# Patient Record
Sex: FEMALE | Race: WHITE | NOT HISPANIC OR LATINO | Employment: FULL TIME | URBAN - METROPOLITAN AREA
[De-identification: names, ages, dates, MRNs, and addresses within clinical notes are randomized per-mention and may not be internally consistent; named-entity substitution may affect disease eponyms.]

---

## 2017-07-07 ENCOUNTER — HOSPITAL ENCOUNTER (EMERGENCY)
Facility: CLINIC | Age: 42
Discharge: HOME | End: 2017-07-07
Attending: EMERGENCY MEDICINE | Admitting: EMERGENCY MEDICINE
Payer: COMMERCIAL

## 2017-07-07 VITALS
RESPIRATION RATE: 18 BRPM | OXYGEN SATURATION: 97 % | HEART RATE: 111 BPM | TEMPERATURE: 98.3 F | DIASTOLIC BLOOD PRESSURE: 67 MMHG | SYSTOLIC BLOOD PRESSURE: 97 MMHG

## 2017-07-07 DIAGNOSIS — R19.7 DIARRHEA, UNSPECIFIED TYPE: ICD-10-CM

## 2017-07-07 LAB
ALBUMIN SERPL-MCNC: 3.8 G/DL (ref 3.4–5)
ALP SERPL-CCNC: 59 U/L (ref 40–150)
ALT SERPL W P-5'-P-CCNC: 18 U/L (ref 0–50)
ANION GAP SERPL CALCULATED.3IONS-SCNC: 7 MMOL/L (ref 3–14)
AST SERPL W P-5'-P-CCNC: 12 U/L (ref 0–45)
BASOPHILS # BLD AUTO: 0 10E9/L (ref 0–0.2)
BASOPHILS NFR BLD AUTO: 0.1 %
BILIRUB SERPL-MCNC: 1.2 MG/DL (ref 0.2–1.3)
BUN SERPL-MCNC: 9 MG/DL (ref 7–30)
C DIFF TOX B STL QL: NORMAL
CALCIUM SERPL-MCNC: 8.3 MG/DL (ref 8.5–10.1)
CAMPYLOBACTER GROUP BY NAT: NOT DETECTED
CHLORIDE SERPL-SCNC: 110 MMOL/L (ref 94–109)
CO2 SERPL-SCNC: 23 MMOL/L (ref 20–32)
CREAT SERPL-MCNC: 0.67 MG/DL (ref 0.52–1.04)
DIFFERENTIAL METHOD BLD: NORMAL
ENTERIC PATHOGEN COMMENT: NORMAL
EOSINOPHIL # BLD AUTO: 0.2 10E9/L (ref 0–0.7)
EOSINOPHIL NFR BLD AUTO: 2.8 %
ERYTHROCYTE [DISTWIDTH] IN BLOOD BY AUTOMATED COUNT: 13.1 % (ref 10–15)
GFR SERPL CREATININE-BSD FRML MDRD: ABNORMAL ML/MIN/1.7M2
GLUCOSE SERPL-MCNC: 90 MG/DL (ref 70–99)
HCT VFR BLD AUTO: 42.1 % (ref 35–47)
HEMOCCULT STL QL: NEGATIVE
HGB BLD-MCNC: 14.1 G/DL (ref 11.7–15.7)
IMM GRANULOCYTES # BLD: 0 10E9/L (ref 0–0.4)
IMM GRANULOCYTES NFR BLD: 0.4 %
LACTOFERRIN STL QL IA: ABNORMAL
LYMPHOCYTES # BLD AUTO: 0.8 10E9/L (ref 0.8–5.3)
LYMPHOCYTES NFR BLD AUTO: 11.7 %
MCH RBC QN AUTO: 31 PG (ref 26.5–33)
MCHC RBC AUTO-ENTMCNC: 33.5 G/DL (ref 31.5–36.5)
MCV RBC AUTO: 93 FL (ref 78–100)
MONOCYTES # BLD AUTO: 0.3 10E9/L (ref 0–1.3)
MONOCYTES NFR BLD AUTO: 4.4 %
NEUTROPHILS # BLD AUTO: 5.5 10E9/L (ref 1.6–8.3)
NEUTROPHILS NFR BLD AUTO: 80.6 %
NOROVIRUS I AND II BY NAT: NOT DETECTED
NRBC # BLD AUTO: 0 10*3/UL
NRBC BLD AUTO-RTO: 0 /100
PLATELET # BLD AUTO: 179 10E9/L (ref 150–450)
POTASSIUM SERPL-SCNC: 3.6 MMOL/L (ref 3.4–5.3)
PROT SERPL-MCNC: 6.8 G/DL (ref 6.8–8.8)
RBC # BLD AUTO: 4.55 10E12/L (ref 3.8–5.2)
ROTAVIRUS A BY NAT: NOT DETECTED
SALMONELLA SPECIES BY NAT: NOT DETECTED
SHIGA TOXIN 1 GENE BY NAT: NOT DETECTED
SHIGA TOXIN 2 GENE BY NAT: NOT DETECTED
SHIGELLA SP+EIEC IPAH STL QL NAA+PROBE: NOT DETECTED
SODIUM SERPL-SCNC: 140 MMOL/L (ref 133–144)
SPECIMEN SOURCE: NORMAL
VIBRIO GROUP BY NAT: NOT DETECTED
WBC # BLD AUTO: 6.8 10E9/L (ref 4–11)
YERSINIA ENTEROCOLITICA BY NAT: NOT DETECTED

## 2017-07-07 PROCEDURE — 87493 C DIFF AMPLIFIED PROBE: CPT | Performed by: EMERGENCY MEDICINE

## 2017-07-07 PROCEDURE — 83630 LACTOFERRIN FECAL (QUAL): CPT | Performed by: EMERGENCY MEDICINE

## 2017-07-07 PROCEDURE — 80053 COMPREHEN METABOLIC PANEL: CPT | Performed by: EMERGENCY MEDICINE

## 2017-07-07 PROCEDURE — 96360 HYDRATION IV INFUSION INIT: CPT

## 2017-07-07 PROCEDURE — 87506 IADNA-DNA/RNA PROBE TQ 6-11: CPT | Performed by: EMERGENCY MEDICINE

## 2017-07-07 PROCEDURE — 36415 COLL VENOUS BLD VENIPUNCTURE: CPT | Performed by: EMERGENCY MEDICINE

## 2017-07-07 PROCEDURE — 85025 COMPLETE CBC W/AUTO DIFF WBC: CPT | Performed by: EMERGENCY MEDICINE

## 2017-07-07 PROCEDURE — 82272 OCCULT BLD FECES 1-3 TESTS: CPT | Performed by: EMERGENCY MEDICINE

## 2017-07-07 PROCEDURE — 99283 EMERGENCY DEPT VISIT LOW MDM: CPT | Mod: 25

## 2017-07-07 PROCEDURE — 96361 HYDRATE IV INFUSION ADD-ON: CPT

## 2017-07-07 PROCEDURE — 25000128 H RX IP 250 OP 636: Performed by: EMERGENCY MEDICINE

## 2017-07-07 PROCEDURE — 25000132 ZZH RX MED GY IP 250 OP 250 PS 637: Performed by: EMERGENCY MEDICINE

## 2017-07-07 RX ORDER — LIDOCAINE 40 MG/G
CREAM TOPICAL
Status: DISCONTINUED | OUTPATIENT
Start: 2017-07-07 | End: 2017-07-07 | Stop reason: HOSPADM

## 2017-07-07 RX ORDER — ACETAMINOPHEN 500 MG
1000 TABLET ORAL ONCE
Status: COMPLETED | OUTPATIENT
Start: 2017-07-07 | End: 2017-07-07

## 2017-07-07 RX ORDER — SODIUM CHLORIDE 9 MG/ML
1000 INJECTION, SOLUTION INTRAVENOUS CONTINUOUS
Status: DISCONTINUED | OUTPATIENT
Start: 2017-07-07 | End: 2017-07-07 | Stop reason: HOSPADM

## 2017-07-07 RX ORDER — DOCUSATE CALCIUM 240 MG
240 CAPSULE ORAL DAILY
Status: DISCONTINUED | OUTPATIENT
Start: 2017-07-07 | End: 2017-07-07 | Stop reason: CLARIF

## 2017-07-07 RX ORDER — DOCUSATE SODIUM 100 MG/1
200 CAPSULE, LIQUID FILLED ORAL DAILY
Status: DISCONTINUED | OUTPATIENT
Start: 2017-07-07 | End: 2017-07-07

## 2017-07-07 RX ADMIN — SODIUM CHLORIDE 1000 ML: 9 INJECTION, SOLUTION INTRAVENOUS at 06:43

## 2017-07-07 RX ADMIN — ACETAMINOPHEN 1000 MG: 500 TABLET, FILM COATED ORAL at 07:28

## 2017-07-07 RX ADMIN — SODIUM CHLORIDE 1000 ML: 9 INJECTION, SOLUTION INTRAVENOUS at 09:06

## 2017-07-07 ASSESSMENT — ENCOUNTER SYMPTOMS
DIARRHEA: 1
ABDOMINAL PAIN: 1
ABDOMINAL DISTENTION: 1
FEVER: 0
CHILLS: 0

## 2017-07-07 NOTE — ED AVS SNAPSHOT
[facility] Emergency Department    [address] [place] [address]    Phone:  [phone]    Fax:  [phone]                                       Alexia Mcginnis   MRN: [id number]    Department:  [facility] Emergency Department   Date of Visit:  7/7/2017           Patient Information     Date Of Birth          1975        Your diagnoses for this visit were:     Diarrhea, unspecified type        You were seen by Jovi Cortés MD and Narciso Ferraro MD.      Follow-up Information     Follow up with Jamaica Kiser MD In 1 day.    Specialty:  Family Practice    Contact information:    [facility]  [address]  [facility] [address]  [phone]          Discharge Instructions       Discharge Instructions  Adult Diarrhea    You have been seen today for diarrhea. This is usually caused by a virus, but some bacteria, parasites, medicines or other medical conditions can cause similar symptoms. At this time your doctor does not find that your diarrhea is a sign of anything dangerous or life-threatening. However, sometimes the signs of serious illness do not show up right away. If you have new or worse symptoms, you may need to be seen again in the Emergency Department or by your primary doctor.     Return to the Emergency Department if:    You feel you are getting dehydrated, such as being very thirsty, not urinating at least every 8-12 hours, or feeling faint or lightheaded.     You develop a new fever, or your fever continues for more than 2 days.     You have belly pain that seems worse than cramps, is in one spot, or is getting worse over time.     You have blood in your stool or your stool becomes black.  (Remember that if you take Pepto-Bismol , this will turn your stool black).     You feel very weak.    You are not starting to improve within 24 hours of your visit here.    What can I do to help myself?    The most important thing  "to do is to drink clear liquids.   It is best to have only small, frequent sips of liquids. Drinking too much at once may cause more diarrhea. You should also replace minerals, sodium and potassium lost with diarrhea. Pedialyte  and sports drinks can help you replace these minerals. You can also drink clear liquids such as water, weak tea, apple juice, and 7-Up . Avoid acid liquids (orange), caffeine (coffee) or alcohol. Milk products will make the diarrhea worse.     Eat only bland foods. Soda crackers, toast, plain noodles, gelatin, applesauce and bananas are good first choices. Avoid foods that have acid, are spicy, fatty or fibrous (such as meats, coarse grains, vegetables). You may start eating these foods again in about 3 days when you are better.     Sometimes treatment includes prescription medicine to prevent diarrhea. If your doctor prescribes these for you, take them as directed.     Nonprescription medicine is available for the treatment of diarrhea and can be very effective. If you use it, make sure you use the dose recommended on the package. Check with your healthcare provider before you use any medicine for diarrhea.     Don t take ibuprofen, or other nonsteroidal anti-inflammatory medicines without checking with your healthcare provider.   Probiotics: If you have been given an antibiotic, you may want to also take a probiotic pill or eat yogurt with live cultures. Probiotics have \"good bacteria\" to help your intestines stay healthy. Studies have shown that probiotics help prevent diarrhea and other intestine problems (including C. diff infection) when you take antibiotics. You can buy these without a prescription in the pharmacy section of the store.   If you were given a prescription for medicine here today, be sure to read all of the information (including the package insert) that comes with your prescription.  This will include important information about the medicine, its side effects, and any " warnings that you need to know about.  The pharmacist who fills the prescription can provide more information and answer questions you may have about the medicine.  If you have questions or concerns that the pharmacist cannot address, please call or return to the Emergency Department.   Opioid Medication Information    Pain medications are among the most commonly prescribed medicines, so we are including this information for all our patients. If you did not receive pain medication or get a prescription for pain medicine, you can ignore it.     You may have been given a prescription for an opioid (narcotic) pain medicine and/or have received a pain medicine while here in the Emergency Department. These medicines can make you drowsy or impaired. You must not drive, operate dangerous equipment, or engage in any other dangerous activities while taking these medications. If you drive while taking these medications, you could be arrested for DUI, or driving under the influence. Do not drink any alcohol while you are taking these medications.     Opioid pain medications can cause addiction. If you have a history of chemical dependency of any type, you are at a higher risk of becoming addicted to pain medications.  Only take these prescribed medications to treat your pain when all other options have been tried. Take it for as short a time and as few doses as possible. Store your pain pills in a secure place, as they are frequently stolen and provide a dangerous opportunity for children or visitors in your house to start abusing these powerful medications. We will not replace any lost or stolen medicine.  As soon as your pain is better, you should flush all your remaining medication.     Many prescription pain medications contain Tylenol  (acetaminophen), including Vicodin , Tylenol #3 , Norco , Lortab , and Percocet .  You should not take any extra pills of Tylenol  if you are using these prescription medications or you can  get very sick.  Do not ever take more than 3000 mg of acetaminophen in any 24 hour period.    All opioids tend to cause constipation. Drink plenty of water and eat foods that have a lot of fiber, such as fruits, vegetables, prune juice, apple juice and high fiber cereal.  Take a laxative if you don t move your bowels at least every other day. Miralax , Milk of Magnesia, Colace , or Senna  can be used to keep you regular.      Remember that you can always come back to the Emergency Department if you are not able to see your regular doctor in the amount of time listed above, if you get any new symptoms, or if there is anything that worries you.        24 Hour Appointment Hotline       To make an appointment at any [facility], call [phone] ([phone]). If you don't have a family doctor or clinic, we will help you find one. [facility] are conveniently located to serve the needs of you and your family.             Review of your medicines      Our records show that you are taking the medicines listed below. If these are incorrect, please call your family doctor or clinic.        Dose / Directions Last dose taken    cholestyramine 4 G Packet   Commonly known as:  QUESTRAN   Dose:  1 packet        Take 1 packet by mouth 3 times daily (with meals).   Refills:  0        EFFEXOR PO        Take by mouth 3 times daily   Refills:  0        estradiol cypionate 5 MG/ML injection   Commonly known as:  DEPO-ESTRADIOL        Inject  into the muscle every 28 days.   Refills:  0                Procedures and tests performed during your visit     CBC + differential    Clostridium difficile toxin B PCR    Comprehensive metabolic panel    Enteric Bacteria and Virus Panel by ISHMAEL Stool    Fecal Lactoferrin    Occult blood stool    Orthostatic blood pressure    Peripheral IV: Standard      Orders Needing Specimen Collection     None      Pending Results     Date and Time Order Name Status Description    7/7/2017 0547  Enteric Bacteria and Virus Panel by ISHMAEL Stool In process     7/7/2017 0547 Clostridium difficile toxin B PCR In process             Pending Culture Results     Date and Time Order Name Status Description    7/7/2017 0547 Enteric Bacteria and Virus Panel by ISHMAEL Stool In process     7/7/2017 0547 Clostridium difficile toxin B PCR In process             Pending Results Instructions     If you had any lab results that were not finalized at the time of your Discharge, you can call the ED Lab Result RN at [phone]. You will be contacted by this team for any positive Lab results or changes in treatment. The nurses are available 7 days a week from 10A to 6:30P.  You can leave a message 24 hours per day and they will return your call.        Test Results From Your Hospital Stay        7/7/2017  6:39 AM      Component Results     Component Value Ref Range & Units Status    WBC 6.8 4.0 - 11.0 10e9/L Final    RBC Count 4.55 3.8 - 5.2 10e12/L Final    Hemoglobin 14.1 11.7 - 15.7 g/dL Final    Hematocrit 42.1 35.0 - 47.0 % Final    MCV 93 78 - 100 fl Final    MCH 31.0 26.5 - 33.0 pg Final    MCHC 33.5 31.5 - 36.5 g/dL Final    RDW 13.1 10.0 - 15.0 % Final    Platelet Count 179 150 - 450 10e9/L Final    Diff Method Automated Method  Final    % Neutrophils 80.6 % Final    % Lymphocytes 11.7 % Final    % Monocytes 4.4 % Final    % Eosinophils 2.8 % Final    % Basophils 0.1 % Final    % Immature Granulocytes 0.4 % Final    Nucleated RBCs 0 0 /100 Final    Absolute Neutrophil 5.5 1.6 - 8.3 10e9/L Final    Absolute Lymphocytes 0.8 0.8 - 5.3 10e9/L Final    Absolute Monocytes 0.3 0.0 - 1.3 10e9/L Final    Absolute Eosinophils 0.2 0.0 - 0.7 10e9/L Final    Absolute Basophils 0.0 0.0 - 0.2 10e9/L Final    Abs Immature Granulocytes 0.0 0 - 0.4 10e9/L Final    Absolute Nucleated RBC 0.0  Final         7/7/2017  6:57 AM      Component Results     Component Value Ref Range & Units Status    Sodium 140 133 - 144 mmol/L Final    Potassium  3.6 3.4 - 5.3 mmol/L Final    Chloride 110 (H) 94 - 109 mmol/L Final    Carbon Dioxide 23 20 - 32 mmol/L Final    Anion Gap 7 3 - 14 mmol/L Final    Glucose 90 70 - 99 mg/dL Final    Urea Nitrogen 9 7 - 30 mg/dL Final    Creatinine 0.67 0.52 - 1.04 mg/dL Final    GFR Estimate >90  Non  GFR Calc   >60 mL/min/1.7m2 Final    GFR Estimate If Black >90   GFR Calc   >60 mL/min/1.7m2 Final    Calcium 8.3 (L) 8.5 - 10.1 mg/dL Final    Bilirubin Total 1.2 0.2 - 1.3 mg/dL Final    Albumin 3.8 3.4 - 5.0 g/dL Final    Protein Total 6.8 6.8 - 8.8 g/dL Final    Alkaline Phosphatase 59 40 - 150 U/L Final    ALT 18 0 - 50 U/L Final    AST 12 0 - 45 U/L Final         7/7/2017  6:03 AM         7/7/2017  6:03 AM         7/7/2017  6:37 AM      Component Results     Component Value Ref Range & Units Status    Occult Blood Negative NEG Final         7/7/2017  6:37 AM      Component Results     Component Value Ref Range & Units Status    Fecal Lactoferrin  NEG Final    Positive   Test not valid for breast fed patients.   (A)                Clinical Quality Measure: Blood Pressure Screening     Your blood pressure was checked while you were in the emergency department today. The last reading we obtained was  BP: 97/67 . Please read the guidelines below about what these numbers mean and what you should do about them.  If your systolic blood pressure (the top number) is less than 120 and your diastolic blood pressure (the bottom number) is less than 80, then your blood pressure is normal. There is nothing more that you need to do about it.  If your systolic blood pressure (the top number) is 120-139 or your diastolic blood pressure (the bottom number) is 80-89, your blood pressure may be higher than it should be. You should have your blood pressure rechecked within a year by a primary care provider.  If your systolic blood pressure (the top number) is 140 or greater or your diastolic blood pressure (the bottom  "number) is 90 or greater, you may have high blood pressure. High blood pressure is treatable, but if left untreated over time it can put you at risk for heart attack, stroke, or kidney failure. You should have your blood pressure rechecked by a primary care provider within the next 4 weeks.  If your provider in the emergency department today gave you specific instructions to follow-up with your doctor or provider even sooner than that, you should follow that instruction and not wait for up to 4 weeks for your follow-up visit.        Thank you for choosing [facility]       Thank you for choosing [facility] for your care. Our goal is always to provide you with excellent care. Hearing back from our patients is one way we can continue to improve our services. Please take a few minutes to complete the written survey that you may receive in the mail after you visit with us. Thank you!        [employer] Information     [employer] lets you send messages to your doctor, view your test results, renew your prescriptions, schedule appointments and more. To sign up, go to [email or web address] . Click on \"Log in\" on the left side of the screen, which will take you to the Welcome page. Then click on \"Sign up Now\" on the right side of the page.     You will be asked to enter the access code listed below, as well as some personal information. Please follow the directions to create your username and password.     Your access code is: [email or web address]  Expires: 10/5/2017  9:25 AM     Your access code will  in 90 days. If you need help or a new code, please call your [facility] clinic or [phone].        Care EveryWhere ID     This is your Care EveryWhere ID. This could be used by other organizations to access your [facility] medical records  [id number]        Equal Access to Services     RONNIE CARSON : yen Solis qaybta kaalmada adeegyada, waxay idiin hayaan adeeg kharash la'aan ah. So kathy " [phone].    ATENCIÓN: Si habla español, tiene a villa disposición servicios gratuitos de asistencia lingüística. Llame al [phone].    We comply with applicable federal civil rights laws and [place] laws. We do not discriminate on the basis of race, color, national origin, age, disability sex, sexual orientation or gender identity.            After Visit Summary       This is your record. Keep this with you and show to your community pharmacist(s) and doctor(s) at your next visit.

## 2017-07-07 NOTE — ED NOTES
Pt worried about c-diff yesterday started having abd pain and uncontrollable diarrhea, has had c diff in past

## 2017-07-07 NOTE — ED AVS SNAPSHOT
[facility] Emergency Department    [address]    [facility] [address]    Phone:  [phone]    Fax:  [phone]                                       Alexia Mcginnis   MRN: [id number]    Department:  [facility] Emergency Department   Date of Visit:  7/7/2017           After Visit Summary Signature Page     I have received my discharge instructions, and my questions have been answered. I have discussed any challenges I see with this plan with the nurse or doctor.    ..........................................................................................................................................  Patient/Patient Representative Signature      ..........................................................................................................................................  Patient Representative Print Name and Relationship to Patient    ..................................................               ................................................  Date                                            Time    ..........................................................................................................................................  Reviewed by Signature/Title    ...................................................              ..............................................  Date                                                            Time

## 2017-07-07 NOTE — ED PROVIDER NOTES
History     Chief Complaint:  Diarrhea    HPI   Alexia Mcginnis is a 42 year old female with a history of Crohn's disease and C. dif who presents with a 17 hour history of diarrhea. The patient reports that she has had around 30 bowel movements since 1600 yesterday. She tried taking Pepto bismol, but this was unsuccessful. She remarks that sometimes she has not felt it coming and has had accidents in her pants. This is similar to the last time she had C. dif. The patient's last course of antibiotics was approximately 4-5 months ago. The patient has associated symptoms of cramping and bloating. She denies any fever or chills. The patient has not had any recent overseas travel.     Allergies:  Penicillins    Medications:    Effexor  Cholestyramine  Estradiol cypionate    Past Medical History:    Anxiety  Chronic neck pain  Crohn's disease  Depressive  Spinal stenosis    Past Surgical History:    Back surgery  Gyn surgery  Small intestine surgery    Family History:    History reviewed. No pertinent family history.     Social History:  Marital Status:   Presents to the ED alone  Tobacco Use: former smoker  Alcohol Use: No  PCP: Jamaica Kiser     Review of Systems   Constitutional: Negative for chills and fever.   Gastrointestinal: Positive for abdominal distention, abdominal pain and diarrhea.   All other systems reviewed and are negative.    Physical Exam   First Vitals:  BP: 112/72  Pulse: 111  Heart Rate: 111  Temp: 98.3  F (36.8  C)  Resp: 18  SpO2: 99 %      Physical Exam  General: Appears uncomfortable  HEENT:   The scalp and head appear normal    The pupils are equal, round, and reactive to light    Extraocular muscles are intact.    The nose is normal.    The oropharynx is normal.      Uvula is in the midline.  There is no peritonsillar abscess.  Neck:  Normal range of motion.    Lungs:  Clear.      No rales, no wheezing.      There is no tachypnea.  Non-labored.  Cardiac: Regular rate.       Normal S1 and S2.      No S3 or S4.      No pathological murmur.      No pericardial rub.  Abdomen: Soft. No distension. No tympani. No rebound. Non-tender.    No localized tenderness of the abdomen. Bowel sounds present throughout.   Lymph: No anterior or posterior cervical lymphadenopathy noted.  MS:  Normal tone.      Normal movement of all extremities.    Neuro:  Normal mentation.  No focal motor or sensory changes.      Speech normal.  Psych:  Awake.     Alert.      Normal affect.      Appropriate interactions.  Skin:  No rash.      No lesions.    Emergency Department Course     Laboratory:  CBC:  WBC 6.8, HGB 14.1, , otherwise WNL   CMP: Calcium 8.3 (L), Chloride 110 (H), otherwise WNL (Creatinine 0.67)     Occult blood stool: negative  Fecal lactoferrin: Positive    Clostridium difficile toxin B PCR: IN PROCESS  Enteric bacteria and virus by ISHMAEL stool: IN PROCESS    Interventions:  (0643) Normal Saline, 1 liter, IV bolus   (0728) Tylenol, 1000 mg, PO   (0906) Normal Saline, 1 liter, IV bolus     Emergency Department Course:  Nursing notes and vitals reviewed.  I performed an exam of the patient as documented above.   A peripheral IV was established. Blood was drawn from the patient. This was sent for laboratory testing, findings above.    Stool sample was obtained and sent for laboratory analysis, findings above.   (4444) I consulted with Dr. Stein of gastroenterology regarding the patient.   Findings and plan explained to the patient. Patient discharged home with instructions regarding supportive care, medications, and reasons to return. The importance of close follow-up was reviewed.   I personally reviewed the laboratory results with the patient and answered all related questions prior to discharge.      Impression & Plan      Medical Decision Making:  Alexia Mcginnis is a 42 year old female who has had recurrent diarrhea over the last fourteen to fifteen hours. It's not been bloody, it has  been black, but she has been taking Pepto bismol. Occult was negative but showed positive fecal lactoferrin. She is really having no other symptoms other than some cramping. Appeared comfortable while here.     Although she has been having recurrent frequent stools, all of her electrolytes are normal including her potassium. Her hemodynamic status shows mild dehydration with mild orthostasis from lying to standing with heart rate going up more than 20 bpm. That was after the first later of saline so I am going to give her a second at this time.     I did contact Dr. Stein of [facility] who recommended strongly against giving anti-peristalsis meds including opioids. He also advised against loperamide or lomotil until we at least have cultures back and the C. dif back. 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 I did discuss this with her. I advised symptomatic treatment including kaopectate as discussed with Dr. Stein. We also discussed dietary instructions which will consist of just clear liquids today and then advancing diet as tolerated thereafter to bananas, rice, applesauce, toast. She should follow up with her PCP tomorrow or return here if she is getting worse or having new symptoms including bloody stool or systemic symptoms.     Diagnosis:    ICD-10-CM    1. Diarrhea, unspecified type R19.7        Disposition:  discharged to home    Chandra SANCHEZ, am serving as a scribe on 7/7/2017 at 6:01 AM to personally document services performed by Dr. Ferraro based on my observations and the provider's statements to me.     [facility] EMERGENCY DEPARTMENT       Narciso Ferraro MD  07/14/17 1473

## 2017-07-07 NOTE — LETTER
[facility] EMERGENCY DEPARTMENT  [address]  [facility] [address]  [phone]    Alexia Mcginnis  [address] [facility] [address]  [phone] (home) [phone] (work)    : 1975      To Whom it may concern:    Alexia Mcginnis was seen in our Emergency Department today, 2017.  I expect her condition to improve over the next 1-2 days.  She may return to work/school when improved.    Sincerely,        Narciso Ferraro MD

## 2018-05-22 ENCOUNTER — HOSPITAL ENCOUNTER (EMERGENCY)
Facility: CLINIC | Age: 43
Discharge: HOME | End: 2018-05-22
Attending: EMERGENCY MEDICINE | Admitting: EMERGENCY MEDICINE
Payer: COMMERCIAL

## 2018-05-22 VITALS
TEMPERATURE: 98.4 F | SYSTOLIC BLOOD PRESSURE: 119 MMHG | RESPIRATION RATE: 16 BRPM | OXYGEN SATURATION: 100 % | DIASTOLIC BLOOD PRESSURE: 70 MMHG

## 2018-05-22 DIAGNOSIS — R42 LIGHTHEADEDNESS: ICD-10-CM

## 2018-05-22 DIAGNOSIS — R51.9 ACUTE NONINTRACTABLE HEADACHE, UNSPECIFIED HEADACHE TYPE: ICD-10-CM

## 2018-05-22 LAB
ANION GAP SERPL CALCULATED.3IONS-SCNC: 8 MMOL/L (ref 3–14)
BASOPHILS # BLD AUTO: 0 10E9/L (ref 0–0.2)
BASOPHILS NFR BLD AUTO: 0.4 %
BUN SERPL-MCNC: 11 MG/DL (ref 7–30)
CALCIUM SERPL-MCNC: 8.8 MG/DL (ref 8.5–10.1)
CHLORIDE SERPL-SCNC: 108 MMOL/L (ref 94–109)
CO2 SERPL-SCNC: 24 MMOL/L (ref 20–32)
CREAT SERPL-MCNC: 0.67 MG/DL (ref 0.52–1.04)
DIFFERENTIAL METHOD BLD: NORMAL
EOSINOPHIL # BLD AUTO: 0.1 10E9/L (ref 0–0.7)
EOSINOPHIL NFR BLD AUTO: 1.4 %
ERYTHROCYTE [DISTWIDTH] IN BLOOD BY AUTOMATED COUNT: 13 % (ref 10–15)
GFR SERPL CREATININE-BSD FRML MDRD: >90 ML/MIN/1.7M2
GLUCOSE SERPL-MCNC: 71 MG/DL (ref 70–99)
HCT VFR BLD AUTO: 40.7 % (ref 35–47)
HGB BLD-MCNC: 13.5 G/DL (ref 11.7–15.7)
IMM GRANULOCYTES # BLD: 0 10E9/L (ref 0–0.4)
IMM GRANULOCYTES NFR BLD: 0.3 %
LYMPHOCYTES # BLD AUTO: 2.3 10E9/L (ref 0.8–5.3)
LYMPHOCYTES NFR BLD AUTO: 30.4 %
MCH RBC QN AUTO: 30.1 PG (ref 26.5–33)
MCHC RBC AUTO-ENTMCNC: 33.2 G/DL (ref 31.5–36.5)
MCV RBC AUTO: 91 FL (ref 78–100)
MONOCYTES # BLD AUTO: 0.6 10E9/L (ref 0–1.3)
MONOCYTES NFR BLD AUTO: 7.3 %
NEUTROPHILS # BLD AUTO: 4.6 10E9/L (ref 1.6–8.3)
NEUTROPHILS NFR BLD AUTO: 60.2 %
NRBC # BLD AUTO: 0 10*3/UL
NRBC BLD AUTO-RTO: 0 /100
PLATELET # BLD AUTO: 200 10E9/L (ref 150–450)
POTASSIUM SERPL-SCNC: 3.6 MMOL/L (ref 3.4–5.3)
RBC # BLD AUTO: 4.49 10E12/L (ref 3.8–5.2)
SODIUM SERPL-SCNC: 140 MMOL/L (ref 133–144)
WBC # BLD AUTO: 7.6 10E9/L (ref 4–11)

## 2018-05-22 PROCEDURE — 96375 TX/PRO/DX INJ NEW DRUG ADDON: CPT

## 2018-05-22 PROCEDURE — 25000128 H RX IP 250 OP 636: Performed by: EMERGENCY MEDICINE

## 2018-05-22 PROCEDURE — 96374 THER/PROPH/DIAG INJ IV PUSH: CPT

## 2018-05-22 PROCEDURE — 80048 BASIC METABOLIC PNL TOTAL CA: CPT | Performed by: EMERGENCY MEDICINE

## 2018-05-22 PROCEDURE — 85025 COMPLETE CBC W/AUTO DIFF WBC: CPT | Performed by: EMERGENCY MEDICINE

## 2018-05-22 PROCEDURE — 96361 HYDRATE IV INFUSION ADD-ON: CPT

## 2018-05-22 PROCEDURE — 99284 EMERGENCY DEPT VISIT MOD MDM: CPT | Mod: 25

## 2018-05-22 RX ORDER — SODIUM CHLORIDE 9 MG/ML
1000 INJECTION, SOLUTION INTRAVENOUS CONTINUOUS
Status: DISCONTINUED | OUTPATIENT
Start: 2018-05-22 | End: 2018-05-22 | Stop reason: HOSPADM

## 2018-05-22 RX ORDER — TRAZODONE HYDROCHLORIDE 100 MG/1
200 TABLET ORAL
COMMUNITY
Start: 2017-10-25

## 2018-05-22 RX ORDER — DIPHENHYDRAMINE HYDROCHLORIDE 50 MG/ML
25 INJECTION INTRAMUSCULAR; INTRAVENOUS ONCE
Status: COMPLETED | OUTPATIENT
Start: 2018-05-22 | End: 2018-05-22

## 2018-05-22 RX ADMIN — DIPHENHYDRAMINE HYDROCHLORIDE 25 MG: 50 INJECTION, SOLUTION INTRAMUSCULAR; INTRAVENOUS at 18:33

## 2018-05-22 RX ADMIN — PROCHLORPERAZINE EDISYLATE 10 MG: 5 INJECTION INTRAMUSCULAR; INTRAVENOUS at 18:33

## 2018-05-22 RX ADMIN — SODIUM CHLORIDE 1000 ML: 9 INJECTION, SOLUTION INTRAVENOUS at 18:33

## 2018-05-22 ASSESSMENT — ENCOUNTER SYMPTOMS
VOMITING: 0
HEADACHES: 1
NAUSEA: 1
WEAKNESS: 0
FEVER: 0
LIGHT-HEADEDNESS: 1
BLOOD IN STOOL: 0
NUMBNESS: 0

## 2018-05-22 NOTE — ED AVS SNAPSHOT
[facility] Emergency Department    [address]    [facility] [address]    Phone:  [phone]    Fax:  [phone]                                       Alexia Mcginnis   MRN: [id number]    Department:  [facility] Emergency Department   Date of Visit:  5/22/2018           After Visit Summary Signature Page     I have received my discharge instructions, and my questions have been answered. I have discussed any challenges I see with this plan with the nurse or doctor.    ..........................................................................................................................................  Patient/Patient Representative Signature      ..........................................................................................................................................  Patient Representative Print Name and Relationship to Patient    ..................................................               ................................................  Date                                            Time    ..........................................................................................................................................  Reviewed by Signature/Title    ...................................................              ..............................................  Date                                                            Time

## 2018-05-22 NOTE — ED PROVIDER NOTES
History     Chief Complaint:  Headache and lightheadedness      HPI   Alexia Mcginnis is a 43 year old female non smoker with a history of anxiety and depression who presents to the emergency department today for evaluation of lightheadedness and headache. The patient reports that early yesterday morning she was at work when she got very lightheaded and her vision got blurry. She states that she sat down and it resolved somewhat but when she stood up it got worse again. Her boss sent her home and the patient reports that about 4 hours later she started getting a headache that has gotten progressively worse.  It was not maximal at onset.  She describes the headache as at times on one side and other times all over her head and also at time dull, pressure and sometimes more severe. Today, the patient states she talked to a nurse at her primary care clinic who advised she be evaluated in the ED. Here in the ED, the patient states that while she is sitting down she does not feel lightheaded but it returns when she stands or walks. She currently rates her headache at a 5/10 with some light and sound sensitivity. She notes that she has some neck stiffness but this happens to her regularly due to a previous cervical spine surgery. She indicates some nausea but no vomiting. She reports last taking ibuprofen at 1200 and Excedrin migraine at [address]. She denies any fever, chest pain, bloody stools, vaginal bleeding, numbness, balance issues, or weakness. She denies any trauma. She denies any history of migraines, hypertension, or diabetes. She has no family history of migraines. She denies any IV drug use.     Allergies:  Gluten Meal  Penicillins     Medications:    Questran  Depo-estradiol  Effexor     Past Medical History:    Anxiety  Chronic neck pain   Crohn's disease  Depressive disorder  Spinal stenosis    Past Surgical History:    Back surgery  Gyn surgery  Small intestine surgery    Family History:    History  reviewed. No pertinent family history.     Social History:  The patient was accompanied to the ED by her .  Smoking Status: Former Smoker  Smokeless Tobacco: Never Used  Alcohol Use: Positive  Marital Status:       Review of Systems   Constitutional: Negative for fever.   Cardiovascular: Negative for chest pain.   Gastrointestinal: Positive for nausea. Negative for blood in stool and vomiting.   Genitourinary: Negative for vaginal bleeding.   Neurological: Positive for light-headedness and headaches. Negative for weakness and numbness.   All other systems reviewed and are negative.      Physical Exam   First Vitals:  BP: 124/81  Heart Rate: 84  Temp: 98.4  F (36.9  C)  Resp: 16  SpO2: 99 %  Lying Orthostatic BP: 111/71  Lying Orthostatic Pulse: 67 bpm  Sitting Orthostatic BP: 121/72  Sitting Orthostatic Pulse: 82 bpm  Standing Orthostatic BP: 110/81  Standing Orthostatic Pulse: 91 bpm     Patient Vitals for the past 24 hrs:   BP Temp Temp src Heart Rate Resp SpO2   05/22/18 1915 - - - - - 100 %   05/22/18 1900 - - - - - 96 %   05/22/18 1845 119/70 - - - - 100 %   05/22/18 1830 117/74 - - - - 97 %   05/22/18 1815 124/77 - - - - -   05/22/18 1805 124/81 98.4  F (36.9  C) Oral 84 16 99 %       Physical Exam  Constitutional: Alert, attentive, GCS 15, middle aged female sitting in bed in no acute distress   HENT:    Nose: Nose normal.    Mouth/Throat: Oropharynx is clear, mucous membranes are moist   Eyes: Normal conjunctiva. Pupils are equal, round, and reactive to light.   Neck: no meningismus   CV: regular rate and rhythm; no murmurs, rubs or gallups  Chest: Effort normal and breath sounds normal.   GI:  There is no tenderness to deep palpation. No distension. Normal bowel sounds.  MSK: Normal range of motion.   Neurological: Alert, attentive, oriented x4, Normal strength and tone, mentation intact, cranial nerves 3-12 intact, finger to nose intact, gait normal  Skin: Skin is warm and dry.      Emergency  Department Course   Laboratory:  Laboratory findings were communicated with the patient who voiced understanding of the findings.    CBC: WBC 7.6, HGB 13.5,   BMP: Creatinine 0.67    Interventions:  1833 NS 1000 ml IV  1833 Compazine 10 mg IV  1833 Benadryl 25 mg IV    Emergency Department Course:    1759 Nursing notes and vitals reviewed.    1802 I performed an exam of the patient as documented above.     1821 IV was inserted and blood was drawn for laboratory testing, results above.    1855 The patient was rechecked and updated. The patient's headache is improved and she was able to walk to the bathroom without difficulty.    1918 The patient was rechecked and updated. The patient's headache is 0 and she would like to go home.     1919 I personally reviewed the laboratory results with the patient and answered all related questions prior to discharge.    Impression & Plan      Medical Decision Making:  Alexia Mcginnis is a 43 year old female who presents to the emergency department today for evaluation of headache and dizziness.  Differential diagnosis includes migraine headache, tension headache, cluster headache, SAH, ruptured aneurysm, ICH, tumor, dural venous sinus thrombosis.  Patient is well appearing, afebrile and has normal neurologic exam.  No ataxia or cerebellar signs.  She felt improved after NS bolus, compazine, and benadryl.  Originally was orthostatic by heart rate, but has since walked to bathroom without eliciting symptoms.  She is requesting to go home now and she reports her headache is completely gone.  I do not think advanced imaging is warranted.  Symptoms are also not consistent with meningitis.  Discussed headache management at home and close follow-up with PCP.  Return precautions discussed with patient and , and she was discharged in stable condition.       Diagnosis:    ICD-10-CM    1. Acute nonintractable headache, unspecified headache type R51 CBC with platelets  differential     Basic metabolic panel   2. Lightheadedness R42      Disposition:   The patient is discharged to home.    Discharge Medications:  No discharge medications.    Scribe Disclosure:  I, Ira Burch, am serving as a scribe at 6:07 PM on 5/22/2018 to document services personally performed by Ros Quiñones MD based on my observations and the provider's statements to me.    [facility] EMERGENCY DEPARTMENT       Ros Quiñones MD  05/23/18 0108

## 2018-05-22 NOTE — ED AVS SNAPSHOT
[facility] Emergency Department    [address]    [facility] [address]    Phone:  [phone]    Fax:  [phone]                                       Alexia Mcginnis   MRN: [id number]    Department:  [facility] Emergency Department   Date of Visit:  5/22/2018           Patient Information     Date Of Birth          1975        Your diagnoses for this visit were:     Acute nonintractable headache, unspecified headache type     Lightheadedness        You were seen by Ros Quiñones MD.      Follow-up Information     Follow up with Jamaica Kiser MD In 3 days.    Specialty:  Family Practice    Why:  for recheck     Contact information:    [facility]  [address]  [facility] [id number] [phone]          Follow up with [facility] Emergency Department.    Specialty:  EMERGENCY MEDICINE    Why:  If symptoms worsen including severe headache, fever with neck stiffness/pain, confusion, or neurologic changes    Contact information:    [address]  [facility] [address] [phone]        Discharge Instructions       Discharge Instructions  Headache    You were seen today for a headache. Headaches may be caused by many different things such as muscle tension, sinus inflammation, anxiety and stress, having too little sleep, too much alcohol, some medical conditions or injury. You may have a migraine, which is caused by changes in the blood vessels in your head.  At this time your provider does not find that your headache is a sign of anything dangerous or life-threatening.  However, sometimes the signs of serious illness do not show up right away.      Generally, every Emergency Department visit should have a follow-up clinic visit with either a primary or a specialty clinic/provider. Please follow-up as instructed by your emergency provider today.    Return to the Emergency Department if:    You get a  new fever of 100.4 F or higher.    Your headache gets much worse.    You get a stiff neck with your headache.    You get a new headache that is significantly different or worse than headaches you have had before.    You are vomiting (throwing up) and cannot keep food or water down.    You have blurry or double vision or other problems with your eyes.    You have a new weakness on one side of your body.    You have difficulty with balance which is new.    You or your family thinks you are confused.    You have a seizure.    What can I do to help myself?    Pain medications - You may take a pain medication such as Tylenol  (acetaminophen), Advil , Motrin  (ibuprofen) or Aleve  (naproxen).    Take a pain reliever as soon as you notice symptoms.  Starting medications as soon as you start to have symptoms may lessen the amount of pain you have.    Relaxing in a quiet, dark room may help.    Get enough sleep and eat meals regularly.    You may need to watch for certain foods or other things which may trigger your headaches.  Keeping a journal of your headaches and possible triggers may help you and your primary provider to identify things which you should avoid which may be causing your headaches.  If you were given a prescription for medicine here today, be sure to read all of the information (including the package insert) that comes with your prescription.  This will include important information about the medicine, its side effects, and any warnings that you need to know about.  The pharmacist who fills the prescription can provide more information and answer questions you may have about the medicine.  If you have questions or concerns that the pharmacist cannot address, please call or return to the Emergency Department.   Remember that you can always come back to the Emergency Department if you are not able to see your regular provider in the amount of time listed above, if you get any new symptoms, or if there is  anything that worries you.      Discharge Instructions  Dizziness (Lightheaded)  Today you were seen for dizziness.  Dizziness can be caused by many things and it can be very difficult to determine the cause of dizziness.  At this time, your provider has found no signs that your dizziness is due to a serious or life-threatening condition. However, sometimes there is a serious problem that does not show up right away, and it is important for you to follow up with your regular provider as instructed.  Generally, every Emergency Department visit should have a follow-up clinic visit with either a primary or a specialty clinic/provider. Please follow-up as instructed by your emergency provider today.      Return to the Emergency Department if:      You pass out (fainting or falling out), especially during exercise.      You develop chest pain, chest pressure or difficulty breathing.    Your feel an irregular heartbeat.    You have excessive vaginal bleeding, or blood in your stool or vomit (throw up).    You have a high fever.    Your symptoms get worse or more frequent.    If when you begin to feel dizzy or lightheaded, it is important to sit down or lay down immediately to prevent injury from falling.  If you were given a prescription for medicine here today, be sure to read all of the information (including the package insert) that comes with your prescription.  This will include important information about the medicine, its side effects, and any warnings that you need to know about.  The pharmacist who fills the prescription can provide more information and answer questions you may have about the medicine.  If you have questions or concerns that the pharmacist cannot address, please call or return to the Emergency Department.   Remember that you can always come back to the Emergency Department if you are not able to see your regular provider in the amount of time listed above, if you get any new symptoms, or if there  is anything that worries you.      24 Hour Appointment Hotline       To make an appointment at any [facility], call [phone] ([phone]). If you don't have a family doctor or clinic, we will help you find one. [facility] are conveniently located to serve the needs of you and your family.             Review of your medicines      Our records show that you are taking the medicines listed below. If these are incorrect, please call your family doctor or clinic.        Dose / Directions Last dose taken    cholestyramine 4 g Packet   Commonly known as:  QUESTRAN   Dose:  1 packet        Take 1 packet by mouth 3 times daily (with meals).   Refills:  0        EFFEXOR PO        Take by mouth 3 times daily   Refills:  0        estradiol cypionate 5 MG/ML injection   Commonly known as:  DEPO-ESTRADIOL        Inject  into the muscle every 28 days.   Refills:  0        traZODone 100 MG tablet   Commonly known as:  DESYREL   Dose:  200 mg        Take 200 mg by mouth   Refills:  0                Procedures and tests performed during your visit     Basic metabolic panel    CBC with platelets differential    Orthostatic blood pressure and pulse      Orders Needing Specimen Collection     None      Pending Results     No orders found from 5/20/2018 to 5/23/2018.            Pending Culture Results     No orders found from 5/20/2018 to 5/23/2018.            Pending Results Instructions     If you had any lab results that were not finalized at the time of your Discharge, you can call the ED Lab Result RN at [phone]. You will be contacted by this team for any positive Lab results or changes in treatment. The nurses are available 7 days a week from 10A to 6:30P.  You can leave a message 24 hours per day and they will return your call.        Test Results From Your Hospital Stay        5/22/2018  6:31 PM      Component Results     Component Value Ref Range & Units Status    WBC 7.6 4.0 - 11.0 10e9/L Final    RBC  Count 4.49 3.8 - 5.2 10e12/L Final    Hemoglobin 13.5 11.7 - 15.7 g/dL Final    Hematocrit 40.7 35.0 - 47.0 % Final    MCV 91 78 - 100 fl Final    MCH 30.1 26.5 - 33.0 pg Final    MCHC 33.2 31.5 - 36.5 g/dL Final    RDW 13.0 10.0 - 15.0 % Final    Platelet Count 200 150 - 450 10e9/L Final    Diff Method Automated Method  Final    % Neutrophils 60.2 % Final    % Lymphocytes 30.4 % Final    % Monocytes 7.3 % Final    % Eosinophils 1.4 % Final    % Basophils 0.4 % Final    % Immature Granulocytes 0.3 % Final    Nucleated RBCs 0 0 /100 Final    Absolute Neutrophil 4.6 1.6 - 8.3 10e9/L Final    Absolute Lymphocytes 2.3 0.8 - 5.3 10e9/L Final    Absolute Monocytes 0.6 0.0 - 1.3 10e9/L Final    Absolute Eosinophils 0.1 0.0 - 0.7 10e9/L Final    Absolute Basophils 0.0 0.0 - 0.2 10e9/L Final    Abs Immature Granulocytes 0.0 0 - 0.4 10e9/L Final    Absolute Nucleated RBC 0.0  Final         5/22/2018  6:49 PM      Component Results     Component Value Ref Range & Units Status    Sodium 140 133 - 144 mmol/L Final    Potassium 3.6 3.4 - 5.3 mmol/L Final    Chloride 108 94 - 109 mmol/L Final    Carbon Dioxide 24 20 - 32 mmol/L Final    Anion Gap 8 3 - 14 mmol/L Final    Glucose 71 70 - 99 mg/dL Final    Urea Nitrogen 11 7 - 30 mg/dL Final    Creatinine 0.67 0.52 - 1.04 mg/dL Final    GFR Estimate >90 >60 mL/min/1.7m2 Final    Non  GFR Calc    GFR Estimate If Black >90 >60 mL/min/1.7m2 Final    African American GFR Calc    Calcium 8.8 8.5 - 10.1 mg/dL Final                Clinical Quality Measure: Blood Pressure Screening     Your blood pressure was checked while you were in the emergency department today. The last reading we obtained was  BP: 124/81 . Please read the guidelines below about what these numbers mean and what you should do about them.  If your systolic blood pressure (the top number) is less than 120 and your diastolic blood pressure (the bottom number) is less than 80, then your blood pressure is  "normal. There is nothing more that you need to do about it.  If your systolic blood pressure (the top number) is 120-139 or your diastolic blood pressure (the bottom number) is 80-89, your blood pressure may be higher than it should be. You should have your blood pressure rechecked within a year by a primary care provider.  If your systolic blood pressure (the top number) is 140 or greater or your diastolic blood pressure (the bottom number) is 90 or greater, you may have high blood pressure. High blood pressure is treatable, but if left untreated over time it can put you at risk for heart attack, stroke, or kidney failure. You should have your blood pressure rechecked by a primary care provider within the next 4 weeks.  If your provider in the emergency department today gave you specific instructions to follow-up with your doctor or provider even sooner than that, you should follow that instruction and not wait for up to 4 weeks for your follow-up visit.        Thank you for choosing [facility]       Thank you for choosing [facility] for your care. Our goal is always to provide you with excellent care. Hearing back from our patients is one way we can continue to improve our services. Please take a few minutes to complete the written survey that you may receive in the mail after you visit with us. Thank you!        [employer] Information     [employer] lets you send messages to your doctor, view your test results, renew your prescriptions, schedule appointments and more. To sign up, go to [email or web address] . Click on \"Log in\" on the left side of the screen, which will take you to the Welcome page. Then click on \"Sign up Now\" on the right side of the page.     You will be asked to enter the access code listed below, as well as some personal information. Please follow the directions to create your username and password.     Your access code is: [email or web address]  Expires: 2018  7:22 PM     Your access code will  in " 90 days. If you need help or a new code, please call your [place] clinic or [phone].        Care EveryWhere ID     This is your Care EveryWhere ID. This could be used by other organizations to access your [place] medical records  [id number]        Equal Access to Services     RONNIE CARSON : Tirso quarles Sorupert, waaxda luqadaha, qaybta kaalmada destiny, juli walker. So [facility] [place] [phone].    ATENCIÓN: Si habla español, tiene a villa disposición servicios gratuitos de asistencia lingüística. Llame al [phone].    We comply with applicable federal civil rights laws and [place] laws. We do not discriminate on the basis of race, color, national origin, age, disability, sex, sexual orientation, or gender identity.            After Visit Summary       This is your record. Keep this with you and show to your community pharmacist(s) and doctor(s) at your next visit.

## 2018-05-22 NOTE — ED TRIAGE NOTES
Patient presents with blurred vision, headache and dizziness since yesterday morning. No history of migraines. ABCDs intact, alert and oriented x 4.

## 2018-05-22 NOTE — ED NOTES
Obtained orthostatics with positive results.  Pt  States she is dizzy ( room spinning) upon transitions, but not dizzy when laying flat.  Headache is worse when laying flat.

## 2018-05-22 NOTE — DISCHARGE INSTRUCTIONS
Discharge Instructions  Headache    You were seen today for a headache. Headaches may be caused by many different things such as muscle tension, sinus inflammation, anxiety and stress, having too little sleep, too much alcohol, some medical conditions or injury. You may have a migraine, which is caused by changes in the blood vessels in your head.  At this time your provider does not find that your headache is a sign of anything dangerous or life-threatening.  However, sometimes the signs of serious illness do not show up right away.      Generally, every Emergency Department visit should have a follow-up clinic visit with either a primary or a specialty clinic/provider. Please follow-up as instructed by your emergency provider today.    Return to the Emergency Department if:    You get a new fever of 100.4 F or higher.    Your headache gets much worse.    You get a stiff neck with your headache.    You get a new headache that is significantly different or worse than headaches you have had before.    You are vomiting (throwing up) and cannot keep food or water down.    You have blurry or double vision or other problems with your eyes.    You have a new weakness on one side of your body.    You have difficulty with balance which is new.    You or your family thinks you are confused.    You have a seizure.    What can I do to help myself?    Pain medications - You may take a pain medication such as Tylenol  (acetaminophen), Advil , Motrin  (ibuprofen) or Aleve  (naproxen).    Take a pain reliever as soon as you notice symptoms.  Starting medications as soon as you start to have symptoms may lessen the amount of pain you have.    Relaxing in a quiet, dark room may help.    Get enough sleep and eat meals regularly.    You may need to watch for certain foods or other things which may trigger your headaches.  Keeping a journal of your headaches and possible triggers may help you and your primary provider to identify  things which you should avoid which may be causing your headaches.  If you were given a prescription for medicine here today, be sure to read all of the information (including the package insert) that comes with your prescription.  This will include important information about the medicine, its side effects, and any warnings that you need to know about.  The pharmacist who fills the prescription can provide more information and answer questions you may have about the medicine.  If you have questions or concerns that the pharmacist cannot address, please call or return to the Emergency Department.   Remember that you can always come back to the Emergency Department if you are not able to see your regular provider in the amount of time listed above, if you get any new symptoms, or if there is anything that worries you.      Discharge Instructions  Dizziness (Lightheaded)  Today you were seen for dizziness.  Dizziness can be caused by many things and it can be very difficult to determine the cause of dizziness.  At this time, your provider has found no signs that your dizziness is due to a serious or life-threatening condition. However, sometimes there is a serious problem that does not show up right away, and it is important for you to follow up with your regular provider as instructed.  Generally, every Emergency Department visit should have a follow-up clinic visit with either a primary or a specialty clinic/provider. Please follow-up as instructed by your emergency provider today.      Return to the Emergency Department if:      You pass out (fainting or falling out), especially during exercise.      You develop chest pain, chest pressure or difficulty breathing.    Your feel an irregular heartbeat.    You have excessive vaginal bleeding, or blood in your stool or vomit (throw up).    You have a high fever.    Your symptoms get worse or more frequent.    If when you begin to feel dizzy or lightheaded, it is  important to sit down or lay down immediately to prevent injury from falling.  If you were given a prescription for medicine here today, be sure to read all of the information (including the package insert) that comes with your prescription.  This will include important information about the medicine, its side effects, and any warnings that you need to know about.  The pharmacist who fills the prescription can provide more information and answer questions you may have about the medicine.  If you have questions or concerns that the pharmacist cannot address, please call or return to the Emergency Department.   Remember that you can always come back to the Emergency Department if you are not able to see your regular provider in the amount of time listed above, if you get any new symptoms, or if there is anything that worries you.

## 2018-05-23 NOTE — ED NOTES
Patient discharged to home. Patient received follow-up information in 3 days with PCP for recheck. Patient received discharge instructions and has no other questions at this time.

## 2019-08-06 ENCOUNTER — APPOINTMENT (OUTPATIENT)
Dept: ULTRASOUND IMAGING | Facility: CLINIC | Age: 44
End: 2019-08-06
Attending: EMERGENCY MEDICINE
Payer: COMMERCIAL

## 2019-08-06 ENCOUNTER — HOSPITAL ENCOUNTER (EMERGENCY)
Facility: CLINIC | Age: 44
Discharge: HOME | End: 2019-08-06
Attending: EMERGENCY MEDICINE | Admitting: EMERGENCY MEDICINE
Payer: COMMERCIAL

## 2019-08-06 VITALS
HEART RATE: 75 BPM | OXYGEN SATURATION: 97 % | SYSTOLIC BLOOD PRESSURE: 106 MMHG | RESPIRATION RATE: 18 BRPM | DIASTOLIC BLOOD PRESSURE: 75 MMHG | TEMPERATURE: 97.3 F

## 2019-08-06 DIAGNOSIS — R10.31 RLQ ABDOMINAL PAIN: ICD-10-CM

## 2019-08-06 DIAGNOSIS — N83.201 CYST OF RIGHT OVARY: ICD-10-CM

## 2019-08-06 LAB
ALBUMIN UR-MCNC: NEGATIVE MG/DL
ANION GAP SERPL CALCULATED.3IONS-SCNC: 5 MMOL/L (ref 3–14)
ANION GAP SERPL CALCULATED.3IONS-SCNC: 7 MMOL/L (ref 3–14)
APPEARANCE UR: CLEAR
B-HCG FREE SERPL-ACNC: <5 IU/L
BACTERIA #/AREA URNS HPF: ABNORMAL /HPF
BASOPHILS # BLD AUTO: 0 10E9/L (ref 0–0.2)
BASOPHILS NFR BLD AUTO: 0.7 %
BILIRUB UR QL STRIP: NEGATIVE
BUN SERPL-MCNC: 4 MG/DL (ref 7–30)
BUN SERPL-MCNC: 5 MG/DL (ref 7–30)
CALCIUM SERPL-MCNC: 5.6 MG/DL (ref 8.5–10.1)
CALCIUM SERPL-MCNC: 8.7 MG/DL (ref 8.5–10.1)
CHLORIDE SERPL-SCNC: 109 MMOL/L (ref 94–109)
CHLORIDE SERPL-SCNC: 121 MMOL/L (ref 94–109)
CO2 SERPL-SCNC: 19 MMOL/L (ref 20–32)
CO2 SERPL-SCNC: 26 MMOL/L (ref 20–32)
COLOR UR AUTO: ABNORMAL
CREAT SERPL-MCNC: 0.48 MG/DL (ref 0.52–1.04)
CREAT SERPL-MCNC: 0.7 MG/DL (ref 0.52–1.04)
DIFFERENTIAL METHOD BLD: NORMAL
EOSINOPHIL # BLD AUTO: 0.1 10E9/L (ref 0–0.7)
EOSINOPHIL NFR BLD AUTO: 2.2 %
ERYTHROCYTE [DISTWIDTH] IN BLOOD BY AUTOMATED COUNT: 13.1 % (ref 10–15)
GFR SERPL CREATININE-BSD FRML MDRD: >90 ML/MIN/{1.73_M2}
GFR SERPL CREATININE-BSD FRML MDRD: >90 ML/MIN/{1.73_M2}
GLUCOSE SERPL-MCNC: 52 MG/DL (ref 70–99)
GLUCOSE SERPL-MCNC: 90 MG/DL (ref 70–99)
GLUCOSE UR STRIP-MCNC: NEGATIVE MG/DL
HCT VFR BLD AUTO: 42 % (ref 35–47)
HGB BLD-MCNC: 13.6 G/DL (ref 11.7–15.7)
HGB UR QL STRIP: NEGATIVE
IMM GRANULOCYTES # BLD: 0 10E9/L (ref 0–0.4)
IMM GRANULOCYTES NFR BLD: 0.2 %
KETONES UR STRIP-MCNC: NEGATIVE MG/DL
LEUKOCYTE ESTERASE UR QL STRIP: NEGATIVE
LYMPHOCYTES # BLD AUTO: 1.5 10E9/L (ref 0.8–5.3)
LYMPHOCYTES NFR BLD AUTO: 32.4 %
MCH RBC QN AUTO: 30 PG (ref 26.5–33)
MCHC RBC AUTO-ENTMCNC: 32.4 G/DL (ref 31.5–36.5)
MCV RBC AUTO: 93 FL (ref 78–100)
MONOCYTES # BLD AUTO: 0.3 10E9/L (ref 0–1.3)
MONOCYTES NFR BLD AUTO: 6.7 %
NEUTROPHILS # BLD AUTO: 2.7 10E9/L (ref 1.6–8.3)
NEUTROPHILS NFR BLD AUTO: 57.8 %
NITRATE UR QL: NEGATIVE
NRBC # BLD AUTO: 0 10*3/UL
NRBC BLD AUTO-RTO: 0 /100
PH UR STRIP: 6 PH (ref 5–7)
PLATELET # BLD AUTO: 190 10E9/L (ref 150–450)
POTASSIUM SERPL-SCNC: 2.5 MMOL/L (ref 3.4–5.3)
POTASSIUM SERPL-SCNC: 3.7 MMOL/L (ref 3.4–5.3)
RBC # BLD AUTO: 4.54 10E12/L (ref 3.8–5.2)
RBC #/AREA URNS AUTO: 0 /HPF (ref 0–2)
SODIUM SERPL-SCNC: 140 MMOL/L (ref 133–144)
SODIUM SERPL-SCNC: 147 MMOL/L (ref 133–144)
SOURCE: ABNORMAL
SP GR UR STRIP: 1.01 (ref 1–1.03)
SPECIMEN SOURCE: NORMAL
SQUAMOUS #/AREA URNS AUTO: 1 /HPF (ref 0–1)
UROBILINOGEN UR STRIP-MCNC: NORMAL MG/DL (ref 0–2)
WBC # BLD AUTO: 4.6 10E9/L (ref 4–11)
WBC #/AREA URNS AUTO: <1 /HPF (ref 0–5)
WET PREP SPEC: NORMAL

## 2019-08-06 PROCEDURE — 84702 CHORIONIC GONADOTROPIN TEST: CPT

## 2019-08-06 PROCEDURE — 25000128 H RX IP 250 OP 636: Performed by: EMERGENCY MEDICINE

## 2019-08-06 PROCEDURE — 25000132 ZZH RX MED GY IP 250 OP 250 PS 637: Performed by: EMERGENCY MEDICINE

## 2019-08-06 PROCEDURE — 87210 SMEAR WET MOUNT SALINE/INK: CPT | Performed by: EMERGENCY MEDICINE

## 2019-08-06 PROCEDURE — 87491 CHLMYD TRACH DNA AMP PROBE: CPT | Performed by: EMERGENCY MEDICINE

## 2019-08-06 PROCEDURE — 99285 EMERGENCY DEPT VISIT HI MDM: CPT | Mod: 25

## 2019-08-06 PROCEDURE — 80048 BASIC METABOLIC PNL TOTAL CA: CPT | Performed by: EMERGENCY MEDICINE

## 2019-08-06 PROCEDURE — 85025 COMPLETE CBC W/AUTO DIFF WBC: CPT | Performed by: EMERGENCY MEDICINE

## 2019-08-06 PROCEDURE — 93976 VASCULAR STUDY: CPT

## 2019-08-06 PROCEDURE — 96360 HYDRATION IV INFUSION INIT: CPT

## 2019-08-06 PROCEDURE — 87591 N.GONORRHOEAE DNA AMP PROB: CPT | Performed by: EMERGENCY MEDICINE

## 2019-08-06 PROCEDURE — 81001 URINALYSIS AUTO W/SCOPE: CPT | Performed by: EMERGENCY MEDICINE

## 2019-08-06 PROCEDURE — 93005 ELECTROCARDIOGRAM TRACING: CPT

## 2019-08-06 RX ORDER — POTASSIUM CHLORIDE 1500 MG/1
40 TABLET, EXTENDED RELEASE ORAL ONCE
Status: COMPLETED | OUTPATIENT
Start: 2019-08-06 | End: 2019-08-06

## 2019-08-06 RX ORDER — OXYCODONE HYDROCHLORIDE 5 MG/1
10 TABLET ORAL ONCE
Status: COMPLETED | OUTPATIENT
Start: 2019-08-06 | End: 2019-08-06

## 2019-08-06 RX ORDER — ACETAMINOPHEN 325 MG/1
975 TABLET ORAL ONCE
Status: COMPLETED | OUTPATIENT
Start: 2019-08-06 | End: 2019-08-06

## 2019-08-06 RX ORDER — SODIUM CHLORIDE 9 MG/ML
1000 INJECTION, SOLUTION INTRAVENOUS CONTINUOUS
Status: DISCONTINUED | OUTPATIENT
Start: 2019-08-06 | End: 2019-08-06 | Stop reason: HOSPADM

## 2019-08-06 RX ADMIN — OXYCODONE HYDROCHLORIDE 10 MG: 5 TABLET ORAL at 09:29

## 2019-08-06 RX ADMIN — ACETAMINOPHEN 975 MG: 325 TABLET, FILM COATED ORAL at 09:29

## 2019-08-06 RX ADMIN — SODIUM CHLORIDE 500 ML: 9 INJECTION, SOLUTION INTRAVENOUS at 09:42

## 2019-08-06 RX ADMIN — POTASSIUM CHLORIDE 40 MEQ: 1500 TABLET, EXTENDED RELEASE ORAL at 12:23

## 2019-08-06 ASSESSMENT — ENCOUNTER SYMPTOMS
FEVER: 0
VOMITING: 0
APPETITE CHANGE: 1
FREQUENCY: 0
DYSURIA: 0
NAUSEA: 0

## 2019-08-06 NOTE — ED AVS SNAPSHOT
[facility] Emergency Department  [address]  [facility] [address]  Phone:  [phone]  Fax:  [phone]                                    Alexia Mcginnis   MRN: [id number]    Department:  [facility] Emergency Department   Date of Visit:  8/6/2019           After Visit Summary Signature Page    I have received my discharge instructions, and my questions have been answered. I have discussed any challenges I see with this plan with the nurse or doctor.    ..........................................................................................................................................  Patient/Patient Representative Signature      ..........................................................................................................................................  Patient Representative Print Name and Relationship to Patient    ..................................................               ................................................  Date                                   Time    ..........................................................................................................................................  Reviewed by Signature/Title    ...................................................              ..............................................  Date                                               Time          22EPIC Rev 08/18

## 2019-08-06 NOTE — DISCHARGE INSTRUCTIONS
Discharge Instructions  Abdominal Pain    Abdominal pain (belly pain) can be caused by many things. Your evaluation today does not show the exact cause for your pain. Your provider today has decided that it is unlikely your pain is due to a life threatening problem, or a problem requiring surgery or hospital admission. Sometimes those problems cannot be found right away, so it is very important that you follow up as directed.  Sometimes only the changes which occur over time allow the cause of your pain to be found.    Generally, every Emergency Department visit should have a follow-up clinic visit with either a primary or a specialty clinic/provider. Please follow-up as instructed by your emergency provider today. With abdominal pain, we often recommend very close follow-up, such as the following day.    ADULTS:  Return to the Emergency Department right away if:    You get an oral temperature above 102oF or as directed by your provider.  You have blood in your stools. This may be bright red or appear as black, tarry stools.    You keep vomiting (throwing up) or cannot drink liquids.  You see blood when you vomit.   You cannot have a bowel movement or you cannot pass gas.  Your stomach gets bloated or bigger.  Your skin or the whites of your eyes look yellow.  You faint.  You have bloody, frequent or painful urination (peeing).  You have new symptoms or anything that worries you.    CHILDREN:  Return to the Emergency Department right away if your child has any of the above-listed symptoms or the following:    Pushes your hand away or screams/cries when his/her belly is touched.  You notice your child is very fussy or weak.  Your child is very tired and is too tired to eat or drink.  Your child is dehydrated.  Signs of dehydration can be:  Significant change in the amount of wet diapers/urine.  Your infant or child starts to have dry mouth and lips, or no saliva (spit) or tears.    PREGNANT WOMEN:  Return to the  Emergency Department right away if you have any of the above-listed symptoms or the following:    You have bleeding, leaking fluid or passing tissue from the vagina.  You have worse pain or cramping, or pain in your shoulder or back.  You have vomiting that will not stop.  You have a temperature of 100oF or more.  Your baby is not moving as much as usual.  You faint.  You get a bad headache with or without eye problems and abdominal pain.  You have a seizure.  You have unusual discharge from your vagina and abdominal pain.    Abdominal pain is pretty common during pregnancy.  Your pain may or may not be related to your pregnancy. You should follow-up closely with your OB provider so they can evaluate you and your baby.  Until you follow-up with your regular provider, do the following:     Avoid sex and do not put anything in your vagina.  Drink clear fluids.  Only take medications approved by your provider.    MORE INFORMATION:    Appendicitis:  A possible cause of abdominal pain in any person who still has their appendix is acute appendicitis. Appendicitis is often hard to diagnose.  Testing does not always rule out early appendicitis or other causes of abdominal pain. Close follow-up with your provider and re-evaluations may be needed to figure out the reason for your abdominal pain.    Follow-up:  It is very important that you make an appointment with your clinic and go to the appointment.  If you do not follow-up with your primary provider, it may result in missing an important development which could result in permanent injury or disability and/or lasting pain.  If there is any problem keeping your appointment, call your provider or return to the Emergency Department.    Medications:  Take your medications as directed by your provider today.  Before using over-the-counter medications, ask your provider and make sure to take the medications as directed.  If you have any questions about medications, ask your  "provider.    Diet:  Resume your normal diet as much as possible, but do not eat fried, fatty or spicy foods while you have pain.  Do not drink alcohol or have caffeine.  Do not smoke tobacco.    Probiotics: If you have been given an antibiotic, you may want to also take a probiotic pill or eat yogurt with live cultures. Probiotics have \"good bacteria\" to help your intestines stay healthy. Studies have shown that probiotics help prevent diarrhea (loose stools) and other intestine problems (including C. diff infection) when you take antibiotics. You can buy these without a prescription in the pharmacy section of the store.     If you were given a prescription for medicine here today, be sure to read all of the information (including the package insert) that comes with your prescription.  This will include important information about the medicine, its side effects, and any warnings that you need to know about.  The pharmacist who fills the prescription can provide more information and answer questions you may have about the medicine.  If you have questions or concerns that the pharmacist cannot address, please call or return to the Emergency Department.       Remember that you can always come back to the Emergency Department if you are not able to see your regular provider in the amount of time listed above, if you get any new symptoms, or if there is anything that worries you.     Contact your Gynecologist about IUD string    Discharge Instructions  Ovarian Cyst    Abdominal (belly) pain can be caused by many things. Your provider today has found that you have a cyst on the ovary. Women in their reproductive years form cysts every month, but only cause pain if they are very large, or if they rupture and release blood or fluid. Fortunately, they rarely require surgery or hospitalization. The pain from a ruptured cyst usually gets gradually better, and should be much better within a few days. If there is a large cyst, " it will usually go away within 1-2 months, but needs to be watched to be sure it does go away, since sometimes a large cyst can become a cancer. There can be complications of a cyst, or other problems that cannot be found right away, so it is very important that you follow up as directed.      Generally, every Emergency Department visit should have a follow-up clinic visit with either a primary or a specialty clinic/provider. Please follow-up as instructed by your emergency provider today.    Return to the Emergency Department right away if:  Your pain becomes much worse or constant  You get an oral temperature above 100.4 F or as directed by your provider.  You have frequent vomiting  You faint, or feel very weak.  You have new symptoms or anything that worries you.    What can I do to help myself?  Take any medication prescribed by your provider.  You may use Tylenol  (acetaminophen) or Advil , Motrin  (ibuprofen) for pain. Be sure to read and follow the package directions, and ask your provider if you have questions.  Avoid sex for several days, because it will probably be painful.    If you were given a prescription for medicine here today, be sure to read all of the information (including the package insert) that comes with your prescription.  This will include important information about the medicine, its side effects, and any warnings that you need to know about.  The pharmacist who fills the prescription can provide more information and answer questions you may have about the medicine.  If you have questions or concerns that the pharmacist cannot address, please call or return to the Emergency Department.     Remember that you can always come back to the Emergency Department if you are not able to see your regular provider in the amount of time listed above, if you get any new symptoms, or if there is anything that worries you.

## 2019-08-06 NOTE — ED TRIAGE NOTES
Patient presents to the ED reporting right low pelvic pain x 3 days. Pain free when laying down, but has pain while sitting or standing. Patient states is wondering if is having problems with her IUD.

## 2019-08-06 NOTE — ED PROVIDER NOTES
History     Chief Complaint:  Pelvic Pain    The history is provided by the patient.      Alexia Mcginnis is a 44 year old female, with an IUD and history of ovarian cysts, who presents with right pelvic pain and in a growth bone stimulator around her neck for a spinal disc fusion. For the past three days, patient has had right pelvic pain and states that she has not been able to feel the strings from her IUD. Patient's pain worsens while sitting or walking. She also had an onset of nausea and vomiting. Since then, patient has tried taking oxycodone for her pain but has felt no relief. The pain is similar to her previous ovarian cyst but states it is lower, so she called her gynecologist who prompted her to the ED after being unsuccessful in scheduling an appointment. Here, patient states her appetite has decreased. Her nausea and vomiting have resolved today. She denies vaginal discharge, vaginal bleeding, fever, dysuria, and increased urine frequency.     Allergies:  Gluten meal  Penicillins     Medications:    Questran  Estradiol cypionate   Venlafaxine  Zanaflex  Medrol   Narcan  Atarax  Oxycodone     Past Medical History:    Anxiety  Chronic neck pain  Crohn's disease  Depressive disorder  Spinal stenosis   Cervical radiculopathy  Insomnia   Tarsal tunnel syndrome   Dysplasia   Herniation of cervical intervertebral disc   Lumbar spondylosis   Brachial nueritis  Post-op infection  Panic attack  LSIL   Viral warts     Past Surgical History:    Back surgery   Gyn surgery  Small intestine surgery   Partial bowel obstruction     Cryotherapy of perineum/anus for viral warts  Placement of artificial cervical disc and arthroplasty of C5-C6  Colposcopy  Laminoforaminotomy   Cervical decompression and fusion      Family History:    Cancer  HTN - sister     Social History:  Former smoker.   Negative for alcohol use.  Negative for drug use.  Marital Status:  .     Review of Systems   Constitutional:  Positive for appetite change. Negative for fever.   Gastrointestinal: Negative for nausea and vomiting.   Genitourinary: Positive for pelvic pain. Negative for dysuria, frequency, vaginal bleeding and vaginal discharge.   All other systems reviewed and are negative.      Physical Exam     Patient Vitals for the past 24 hrs:   BP Temp Pulse Heart Rate Resp SpO2   08/06/19 1330 -- -- -- 76 -- --   08/06/19 1315 106/75 -- -- 82 -- 97 %   08/06/19 1300 108/70 -- 75 76 -- 97 %   08/06/19 1245 108/63 -- -- 75 -- 97 %   08/06/19 1215 113/68 -- -- 75 -- 96 %   08/06/19 1200 105/63 -- 72 70 -- 98 %   08/06/19 1145 107/67 -- -- 69 -- 97 %   08/06/19 1130 109/67 -- -- -- -- --   08/06/19 1100 115/57 -- 66 66 -- 97 %   08/06/19 1030 97/69 -- 69 72 -- 96 %   08/06/19 1015 108/67 -- 73 75 -- 96 %   08/06/19 1000 110/62 -- 70 67 -- 97 %   08/06/19 0947 110/70 -- -- 75 18 97 %   08/06/19 0945 110/70 -- 70 -- -- --   08/06/19 0842 119/65 97.3  F (36.3  C) 84 -- 16 97 %     Physical Exam    Nursing note and vitals reviewed.    Constitutional: Pleasant and well groomed.          HENT:    Mouth/Throat: Oropharynx is without swelling or erythema. Oral mucosa moist.    Eyes: Conjunctivae are normal. No scleral icterus.    Neck: Neck supple.   Cardiovascular: Normal rate, regular rhythm and intact distal pulses.    Pulmonary/Chest: Effort normal and breath sounds normal.   Abdominal: Soft.  No distension. There is very low RLQ tenderness to deep palpation without guarding. .   Musculoskeletal:  No edema, No calf tenderness  Genitourinary: Normal external genitalia. Small amount white/yellow vaginal discharge. Normal appearing cervix. No IUD strings visualized from cervical os. No cervical motion tenderness. No adnexal tenderness/masses appreciated.   Neurological:Alert. Coordination normal.   Skin: Skin is warm and dry.   Psychiatric: Normal mood and affect.     Emergency Department Course   ECG:   Indication: Pelvic pain  Time:  1210  Vent. Rate 71 bpm. VA interval 168. QRS duration 92. QT/QTc 428/465. P-R-T axis 60 36 21. Normal sinus rhythm. Normal ECG. Agrees with computer intrepretations. Read time: 1220.    Imaging:  Radiographic findings were communicated with the patient who voiced understanding of the findings.    US Pelvic Complete w Transvaginal & Abd/Pel Duplex Limited   Preliminary Result   IMPRESSION: 3.8 cm simple cyst in the left ovary, likely benign.   As per radiology.        Laboratory:  CBC: WBC: 4.6, HGB: 13.6, PLT: 190  BMP: BUN 5 (L), o/w WNL (Creatinine: 0.70)    Wet prep: WNL.    Neisseria honorrhoea PCR: pending.   Chlamydia trachomatis PCR: pending.     UA with micro: light yellow, clear, bacteria few, o/w negative    Interventions:  0929 Oxycodone tablet 10 mg PO  0929 Tylenol tablet 975 mg PO   1223 Potassium chloride tablet 40 mEq PO    Emergency Department Course:  0913 Nursing notes and vitals reviewed. I performed an exam of the patient as documented above.     The patient was placed on continuous pulse oximetry and cardiac monitoring while here in the ED.      Medicine administered as documented above. Blood drawn. This was sent to the lab for further testing, results above. The patient provided a urine sample here in the emergency department. This was sent for laboratory testing, findings above.     EKG obtained in the ED, see results above.     The patient was sent for a pelvic US while in the emergency department, findings above.     1330 I rechecked the patient and discussed the results of her workup thus far.     Findings and plan explained to the Patient. Patient discharged home with instructions regarding supportive care, medications, and reasons to return. The importance of close follow-up was reviewed.     I personally reviewed the laboratory results with the Patient and answered all related questions prior to discharge.     Impression & Plan    Medical Decision Making:  The differential diagnosis  included but was not limited to ectopic pregnancy, UTI, ovarian cyst, ovarian torsion, PID, appendicitis, nephrolithiasis. The ED evaluation included labs and US as noted above.. The patient is feeling better after treatment in the ED. She was discovered to have an ovarian cyst but nothing concerning for torsion and the IUD appears to be in place. I recommend follow up for the ovarian cyst and the IUD string with her primary OBGYN. We discussed that I have not completely ruled out appendicitis but I do not feel that a CT scan is warranted at this time. She will return in 8-10 hours or at any point with worsening symptoms otherwise follow up here in clinic if she has had no improvement.    Addendum:  Initial BMP returned with hypokalemia. EKG obtained without findings. Repeat BMP was normal.     Critical Care time:  none    Diagnosis:    ICD-10-CM    1. RLQ abdominal pain R10.31    2. Cyst of right ovary N83.201      Disposition:  discharged to home    Scribe Disposition  I, Catarina Whitehead, am serving as a scribe on 8/6/2019 at 9:25 AM to personally document services performed by Colleen Gay MD based on my observations and the provider's statements to me.     Catarina Whitehead  8/6/2019   [facility] EMERGENCY DEPARTMENT       Colleen Gay MD  08/07/19 [id number]

## 2019-08-07 LAB
C TRACH DNA SPEC QL NAA+PROBE: NEGATIVE
INTERPRETATION ECG - MUSE: NORMAL
N GONORRHOEA DNA SPEC QL NAA+PROBE: NEGATIVE
SPECIMEN SOURCE: NORMAL
SPECIMEN SOURCE: NORMAL

## 2019-08-07 NOTE — RESULT ENCOUNTER NOTE
Final result for both N. Gonorrhoeae PCR and Chlamydia Trachomatis PCR are NEGATIVE.  No treatment or change in treatment per [place] ED Lab Result protocol.

## 2020-07-11 ENCOUNTER — APPOINTMENT (OUTPATIENT)
Dept: GENERAL RADIOLOGY | Facility: CLINIC | Age: 45
End: 2020-07-11
Attending: EMERGENCY MEDICINE
Payer: COMMERCIAL

## 2020-07-11 ENCOUNTER — HOSPITAL ENCOUNTER (EMERGENCY)
Facility: CLINIC | Age: 45
Discharge: HOME | End: 2020-07-11
Attending: EMERGENCY MEDICINE | Admitting: EMERGENCY MEDICINE
Payer: COMMERCIAL

## 2020-07-11 VITALS
RESPIRATION RATE: 9 BRPM | HEIGHT: 67 IN | HEART RATE: 88 BPM | TEMPERATURE: 98.1 F | SYSTOLIC BLOOD PRESSURE: 121 MMHG | WEIGHT: 152 LBS | BODY MASS INDEX: 23.86 KG/M2 | DIASTOLIC BLOOD PRESSURE: 72 MMHG | OXYGEN SATURATION: 99 %

## 2020-07-11 DIAGNOSIS — Z20.822 SUSPECTED COVID-19 VIRUS INFECTION: ICD-10-CM

## 2020-07-11 DIAGNOSIS — J06.9 UPPER RESPIRATORY TRACT INFECTION, UNSPECIFIED TYPE: ICD-10-CM

## 2020-07-11 DIAGNOSIS — R07.9 CHEST PAIN, UNSPECIFIED TYPE: ICD-10-CM

## 2020-07-11 LAB
ANION GAP SERPL CALCULATED.3IONS-SCNC: 7 MMOL/L (ref 3–14)
BASOPHILS # BLD AUTO: 0 10E9/L (ref 0–0.2)
BASOPHILS NFR BLD AUTO: 0.5 %
BUN SERPL-MCNC: 7 MG/DL (ref 7–30)
CALCIUM SERPL-MCNC: 9.1 MG/DL (ref 8.5–10.1)
CHLORIDE SERPL-SCNC: 103 MMOL/L (ref 94–109)
CO2 SERPL-SCNC: 25 MMOL/L (ref 20–32)
CREAT SERPL-MCNC: 0.76 MG/DL (ref 0.52–1.04)
D DIMER PPP FEU-MCNC: 0.3 UG/ML FEU (ref 0–0.5)
DIFFERENTIAL METHOD BLD: NORMAL
EOSINOPHIL # BLD AUTO: 0.1 10E9/L (ref 0–0.7)
EOSINOPHIL NFR BLD AUTO: 1.6 %
ERYTHROCYTE [DISTWIDTH] IN BLOOD BY AUTOMATED COUNT: 12.7 % (ref 10–15)
GFR SERPL CREATININE-BSD FRML MDRD: >90 ML/MIN/{1.73_M2}
GLUCOSE SERPL-MCNC: 86 MG/DL (ref 70–99)
HCT VFR BLD AUTO: 43.2 % (ref 35–47)
HGB BLD-MCNC: 13.9 G/DL (ref 11.7–15.7)
IMM GRANULOCYTES # BLD: 0 10E9/L (ref 0–0.4)
IMM GRANULOCYTES NFR BLD: 0.3 %
LYMPHOCYTES # BLD AUTO: 1.6 10E9/L (ref 0.8–5.3)
LYMPHOCYTES NFR BLD AUTO: 26.2 %
MCH RBC QN AUTO: 30.8 PG (ref 26.5–33)
MCHC RBC AUTO-ENTMCNC: 32.2 G/DL (ref 31.5–36.5)
MCV RBC AUTO: 96 FL (ref 78–100)
MONOCYTES # BLD AUTO: 0.4 10E9/L (ref 0–1.3)
MONOCYTES NFR BLD AUTO: 6.8 %
NEUTROPHILS # BLD AUTO: 4 10E9/L (ref 1.6–8.3)
NEUTROPHILS NFR BLD AUTO: 64.6 %
NRBC # BLD AUTO: 0 10*3/UL
NRBC BLD AUTO-RTO: 0 /100
PLATELET # BLD AUTO: 190 10E9/L (ref 150–450)
POTASSIUM SERPL-SCNC: 3.9 MMOL/L (ref 3.4–5.3)
RBC # BLD AUTO: 4.51 10E12/L (ref 3.8–5.2)
SODIUM SERPL-SCNC: 135 MMOL/L (ref 133–144)
TROPONIN I SERPL-MCNC: <0.015 UG/L (ref 0–0.04)
WBC # BLD AUTO: 6.2 10E9/L (ref 4–11)

## 2020-07-11 PROCEDURE — 25000132 ZZH RX MED GY IP 250 OP 250 PS 637: Performed by: EMERGENCY MEDICINE

## 2020-07-11 PROCEDURE — 93005 ELECTROCARDIOGRAM TRACING: CPT

## 2020-07-11 PROCEDURE — 94640 AIRWAY INHALATION TREATMENT: CPT

## 2020-07-11 PROCEDURE — U0003 INFECTIOUS AGENT DETECTION BY NUCLEIC ACID (DNA OR RNA); SEVERE ACUTE RESPIRATORY SYNDROME CORONAVIRUS 2 (SARS-COV-2) (CORONAVIRUS DISEASE [COVID-19]), AMPLIFIED PROBE TECHNIQUE, MAKING USE OF HIGH THROUGHPUT TECHNOLOGIES AS DESCRIBED BY CMS-2020-01-R: HCPCS | Performed by: EMERGENCY MEDICINE

## 2020-07-11 PROCEDURE — 85379 FIBRIN DEGRADATION QUANT: CPT | Performed by: EMERGENCY MEDICINE

## 2020-07-11 PROCEDURE — 85025 COMPLETE CBC W/AUTO DIFF WBC: CPT | Performed by: EMERGENCY MEDICINE

## 2020-07-11 PROCEDURE — C9803 HOPD COVID-19 SPEC COLLECT: HCPCS

## 2020-07-11 PROCEDURE — 84484 ASSAY OF TROPONIN QUANT: CPT | Performed by: EMERGENCY MEDICINE

## 2020-07-11 PROCEDURE — 80048 BASIC METABOLIC PNL TOTAL CA: CPT | Performed by: EMERGENCY MEDICINE

## 2020-07-11 PROCEDURE — 99285 EMERGENCY DEPT VISIT HI MDM: CPT | Mod: 25

## 2020-07-11 PROCEDURE — 71045 X-RAY EXAM CHEST 1 VIEW: CPT

## 2020-07-11 RX ORDER — ALBUTEROL SULFATE 90 UG/1
6 AEROSOL, METERED RESPIRATORY (INHALATION) ONCE
Status: COMPLETED | OUTPATIENT
Start: 2020-07-11 | End: 2020-07-11

## 2020-07-11 RX ORDER — BUPROPION HYDROCHLORIDE 150 MG/1
150 TABLET ORAL DAILY
COMMUNITY
Start: 2018-09-12

## 2020-07-11 RX ORDER — ALBUTEROL SULFATE 90 UG/1
2 AEROSOL, METERED RESPIRATORY (INHALATION) EVERY 6 HOURS PRN
Qty: 1 INHALER | Refills: 0 | Status: SHIPPED | OUTPATIENT
Start: 2020-07-11

## 2020-07-11 RX ADMIN — ALBUTEROL SULFATE 6 PUFF: 90 AEROSOL, METERED RESPIRATORY (INHALATION) at 12:50

## 2020-07-11 ASSESSMENT — ENCOUNTER SYMPTOMS
MYALGIAS: 1
FEVER: 1
NAUSEA: 1
WEAKNESS: 1
DIARRHEA: 0
COUGH: 0
FATIGUE: 1
HEADACHES: 1
SHORTNESS OF BREATH: 1

## 2020-07-11 ASSESSMENT — MIFFLIN-ST. JEOR: SCORE: 1377.1

## 2020-07-11 NOTE — DISCHARGE INSTRUCTIONS
Discharge Instructions  COVID-19    Your Provider has determined that you should practice self-isolation and self-monitoring in order to protect yourself and your community from COVID-19, which is the disease caused by a new coronavirus. The virus spreads from person to person primarily by droplets when an infected person coughs or sneezes and the droplet either lands on another person or that other person touches a surface with the droplet on it. Diagnosis of COVID-19 can be made with a test but many times the test is unavailable or not necessary. There is no specific treatment or medicine for the disease.    Symptoms of COVID-19  Many people have no symptoms or mild symptoms.  Symptoms may usually appear 4 to 5 days (up to 14 days) after contact with another ill person. Some people will get severe symptoms and pneumonia. Usual symptoms are:       Fever    Cough    Trouble breathing    Less common symptoms are: Headache, body aches, sore throat,     sneezing, diarrhea.    How to Care for Yourself    Stay home.  Most people will recover from illness with mild symptoms.  Isolation by staying home is the best method to prevent the spread of the illness. Do not go to work or school. Have a friend or relative do your shopping. Do not use public transportation (bus, train) or ridesharing (Lyft, Uber).    How long should I stay home?  If you have symptoms of a respiratory disease (fever, cough), you should stay home for at least 10 days, and for 3 days with no fever and improvement of respiratory symptoms--whichever is longer. (Your fever should be gone for 3 days without using fever-reducing medicine.)    For example, if you have a fever and cough for 6 days, you need to stay home 4 more days with no fever for a total of 10 days. Or, if you have a fever and cough for 8 days, you need to stay home 3 more days with no fever for a total of 11 days.    Separate yourself from other people: in your home.?As much as possible,  you should stay in one room and away from other people in your home. Also, use a separate bathroom, if possible. Avoid handling pets or other animals while sick.     Wear a facemask: if you need to be around other people and cover your mouth and nose with a tissue when you cough or sneeze.     Avoid sharing personal household items: You should not share dishes, drinking glasses, forks/knives/spoons, towels, or bedding with other people in your home. After using these items, they should be washed with soap and water. Clean parts of your home that are touched often (doorknobs, faucets, countertops, etc.) daily.     Wash your hands: often with soap and water for at least 20 seconds or use an alcohol-based hand  containing at least 60% alcohol.     Avoid touching your face.    Treat your symptoms: Take Acetaminophen (Tylenol) to treat body aches and fever as needed for comfort. Ibuprofen (Advil or Motrin) can be used as well if you still have symptoms after taking Tylenol.  Drink fluids. Rest.    Watch for worsening symptoms: shortness of breath, or difficulty breathing.    Employers/workplaces: are being asked by the Centers for Disease Control (CDC) to not request notes/documentation for you to return to work or prove that you were ill. You may choose to show your employer this paperwork.    Return to the Emergency Department if:  If you are developing worsening breathing, shortness of breath, or feel worse you should seek medical attention.  If you are uncertain, contact your health care provider/clinic. If you need emergency medical attention, call 911 and tell them you have been ill.

## 2020-07-11 NOTE — ED PROVIDER NOTES
History     Chief Complaint:  Chest Pain and Shortness of Breath    The history is provided by the patient and medical records.      Alexia Mcginnis is a 45 year old female who presents with left sided chest pain and shortness of breath. On Thursday (3 days ago), the patient began to experience chest pain and shortness of breath; this prompted her to get a COVID-19 test yesterday which came back negative. The patient began to experience a fever, headache, nausea, congestion, body aches and nausea yesterday as well; when these persisted, she decided to come to the ED today. The patient recorded her temperature yesterday at 100.9 degrees. The patient has taken Tylenol but has had no relief of symptoms. The patient denies diarrhea and cough. The patient also denies any known COVID - 19 exposure but works as a  and comes in contact with people everyday.     Of note, the patient is a former smoker but has not in almost 2 years.     Allergies:  Penicillins     Medications:    Wellbutrin XL  Questran   Desyrel   Venlafaxine    Past Medical History:    Anxiety   Chronic neck pain   Crohn's disease  Depression   Spinal stenosis  Cervical radiculopathy   Tarsal tunnel syndrome  Dysplasia of cervix  Brachial neuritis or radiculitis   Low grade squamous intraepithelial lesion     Past Surgical History:    Partial bowel resection    section   Colonoscopy   Placement of artificial cervical disc and arthroplasty   Posterior laminoforaminotomy   Anterior cervical decompression and fusion C4-6 and removal of disc replacement C5-6    Family History:    Cancer  Hypertension     Social History:  Marital Status:     Smoke: Former   Alcohol: No  Drug: No      Review of Systems   Constitutional: Positive for fatigue and fever.   Respiratory: Positive for shortness of breath. Negative for cough.    Cardiovascular: Positive for chest pain.   Gastrointestinal: Positive for nausea. Negative for diarrhea.  "  Musculoskeletal: Positive for myalgias.   Neurological: Positive for weakness and headaches.   All other systems reviewed and are negative.    Physical Exam     Patient Vitals for the past 24 hrs:   BP Temp Temp src Pulse Heart Rate Resp SpO2 Height Weight   07/11/20 1430 121/72 -- -- 88 85 9 -- -- --   07/11/20 1415 120/73 -- -- 89 90 13 -- -- --   07/11/20 1400 118/70 -- -- 83 82 16 -- -- --   07/11/20 1345 116/74 -- -- 88 85 12 -- -- --   07/11/20 1330 114/76 -- -- 85 85 12 -- -- --   07/11/20 1250 119/73 -- -- -- 78 22 -- -- --   07/11/20 1230 118/78 -- -- 91 89 10 -- -- --   07/11/20 1214 126/82 98.1  F (36.7  C) Oral 85 -- 16 99 % 1.702 m (5' 7\") 68.9 kg (152 lb)       Physical Exam    General:              Well-nourished              Speaking in full sentences  Eyes:              Conjunctiva without injection or scleral icterus  ENT:              Moist mucous membranes              Nares patent              Pinnae normal  Neck:              Full ROM              No stiffness appreciated  Resp:              Lungs CTAB              No crackles, wheezing or audible rubs              Good air movement  CV:                    Normal rate, regular rhythm              S1 and S2 present              No murmur, gallop or rub  GI:              BS present              Abdomen soft without distention              Non-tender to light and deep palpation              No guarding or rebound tenderness  Skin:              Warm, dry, well perfused              No rashes or open wounds on exposed skin  MSK:              Moves all extremities              No focal deformities or swelling              Tenderness to palpation over anterior right chest wall              Reproduces pain  Neuro:              Alert              Answers questions appropriately              Moves all extremities equally              Gait stable  Psych:              Normal affect, normal mood  Emergency Department Course     ECG:  Indication: Chest " pain   Time: 1225  Vent. Rate 86 bpm. IL interval 154. QRS duration 92. QT/QTc 388/464. P-R-T axis 68 57 31. Sinus rhythm; Normal ECG; When compared with ECG of 06-AUG-2019- 1210 Read time: 1229    Imaging:  Radiology findings were communicated with the patient who voiced understanding of the findings.    XR Chest, Port 1 view:  Single portable AP view of the chest was obtained. Cardiomediastinal silhouette is within normal limits. Mild bibasilar streaky pulmonary opacities, likely atelectasis. No significant pleural effusion or pneumothorax.. Reading per radiology.    Laboratory:  Laboratory findings were communicated with the patient who voiced understanding of the findings.    BMP: Glucose 86, o/w WNL (Creatinine: 0.76)    CBC: WBC: 6.2, HGB: 13.9, PLT: 190    D dimer - 0.3    1247 Troponin - <0.015    Systomatic COVID 19 - Pending     Interventions:  1250 Albuterol 6 puffs inhalation    Emergency Department Course:  Past medical records, nursing notes, and vitals reviewed.    1230 I performed an exam of the patient as documented above.     EKG obtained in the ED, see results above.   IV was inserted and blood was drawn for laboratory testing, results above.  The patient was sent for a Xray chest port while in the emergency department, results above.     1415 I rechecked the patient and discussed the results of her workup thus far.     The patient's nose was swabbed and this sample was sent for viral testing.       Findings and plan explained to the Patient. Patient discharged home with instructions regarding supportive care, medications, and reasons to return. The importance of close follow-up was reviewed. She was prescribed an albuterol inhaler.     I personally reviewed the laboratory and imaging results with the Patient and answered all related questions prior to discharge.     Impression & Plan   Covid-19  Alexia Mcginnis was evaluated during a global COVID-19 pandemic, which necessitated consideration  that the patient might be at risk for infection with the SARS-CoV-2 virus that causes COVID-19.   Applicable protocols for evaluation were followed during the patient's care.   COVID-19 was considered as part of the patient's evaluation. The plan for testing is:  a test was obtained during this visit.    Medical Decision Making:  Alexia Mcginnis is a 45-year-old female presenting to the ED for evaluation of chest pain, shortness of breath, and fever.  VS on presentation unremarkable.  DDX is broad, including though was not limited to, URI, viral syndrome, COVID, pneumonia, bronchitis, ACS, PE, among others.  Given the patient's symptom complex, high suspicion for viral syndrome.  In light of patient's line of work, and exposure to multiple people, COVID remains on the differential.  She reports negative testing yesterday, though given the early course of her illness, repeat testing will be obtained today.  Chest x-ray does not reveal evidence of lobar infiltrate.  She does have findings of mild atelectasis, though the absence of cough, feel this is unlikely to represent pneumonia.  Other causes were strongly considered as well.  PE unlikely as patient is low risk by Wells criteria and d-dimer returned within normal limits.  With regards to her chest discomfort, she does have reproducible tenderness to palpation over her right chest wall.  This likely suggests a musculoskeletal source.  EKG demonstrates sinus rhythm without findings of acute ischemia and troponin returned undetectable.  Given the duration of her symptoms for 2 days, doubt ACS.  History and exam not consistent with aortic dissection.  Abdominal exam is soft and nontender.  No other focal source of infection identified by history or exam.  Results and clinical impression were discussed with the patient.  At this time I feel she is appropriate for discharge home with supportive outpatient treatment.  Supportive treatments discussed.  Recommended  isolation/quarantine in accordance with guidelines put forth by the [facility].  Return to ED with worsened shortness of breath, chest pain, fevers, or any other new or troubling symptoms.  All questions are answered prior to discharge.  Otherwise follow-up with PCP in 2 to 3 days for recheck.    Diagnosis:    ICD-10-CM    1. Chest pain, unspecified type  R07.9    2. Suspected COVID-19 virus infection  Z20.828    3. Upper respiratory tract infection, unspecified type  J06.9        Disposition:  Discharged to home.    Discharge Medications:   Albuterol inhaler     Scribe Disclosure:  ISalvador, am serving as a scribe at 2:10 PM on 7/11/2020 to document services personally performed by Dirk Rosario MD based on my observations and the provider's statements to me.   ITricia, am serving as a scribe on 7/11/2020 at 2:36 PM to personally document services performed by Dirk Rosario MD based on my observations and the provider's statements to me.         Dirk Rosario MD  07/12/20 0906

## 2020-07-11 NOTE — ED TRIAGE NOTES
Patient is here for evaluation of right sided chest pain and shortness of breath on Thursday.  The chest pain is worsening with time.    COVID-19 test came back negative yesterday.  She had a fever last night and feels fatigued, she has been having headaches.  ABCs intact.  Patient is alert and oriented x3.

## 2020-07-11 NOTE — LETTER
July 14, 2020        Alexia Mcginnis  [address] [address] [place] [address]    This letter provides a written record that you were tested for COVID-19 on 7/11/20.      Your result was negative. This means that we didn t find the virus that causes COVID-19 in your sample. A test may show negative when you do actually have the virus. This can happen when the virus is in the early stages of infection, before you feel illness symptoms.    If you have symptoms   Stay home and away from others (self-isolate) until you meet ALL of the guidelines below:    You ve had no fever--and no medicine that reduces fever--for 3 full days (72 hours). And      Your other symptoms have gotten better. For example, your cough or breathing has improved. And     At least 10 days have passed since your symptoms started.    During this time:    Stay home. Don t go to work, school or anywhere else.     Stay in your own room, including for meals. Use your own bathroom if you can.    Stay away from others in your home. No hugging, kissing or shaking hands. No visitors.    Clean  high touch  surfaces often (doorknobs, counters, handles, etc.). Use a household cleaning spray or wipes. You can find a full list on the EPA website at www.epa.gov/pesticide-registration/list-n-disinfectants-use-against-sars-cov-2.    Cover your mouth and nose with a mask, tissue or washcloth to avoid spreading germs.    Wash your hands and face often with soap and water.    Going back to work  Check with your employer for any guidelines to follow for going back to work.    Employers: This document serves as formal notice that your employee tested negative for COVID-19, as of the testing date shown above.

## 2020-07-11 NOTE — ED AVS SNAPSHOT
[facility] Emergency Department  [address]  [facility] [place] [address]  Phone:  [phone]  Fax:  [phone]                                    Alexia Mcginnis   MRN: [id number]    Department:  [facility] Emergency Department   Date of Visit:  7/11/2020           After Visit Summary Signature Page    I have received my discharge instructions, and my questions have been answered. I have discussed any challenges I see with this plan with the nurse or doctor.    ..........................................................................................................................................  Patient/Patient Representative Signature      ..........................................................................................................................................  Patient Representative Print Name and Relationship to Patient    ..................................................               ................................................  Date                                   Time    ..........................................................................................................................................  Reviewed by Signature/Title    ...................................................              ..............................................  Date                                               Time          22EPIC Rev 08/18

## 2020-07-13 LAB
INTERPRETATION ECG - MUSE: NORMAL
SARS-COV-2 RNA SPEC QL NAA+PROBE: NOT DETECTED
SPECIMEN SOURCE: NORMAL

## 2020-12-14 ENCOUNTER — HEALTH MAINTENANCE LETTER (OUTPATIENT)
Age: 45
End: 2020-12-14

## 2021-10-02 ENCOUNTER — HEALTH MAINTENANCE LETTER (OUTPATIENT)
Age: 46
End: 2021-10-02

## 2022-01-22 ENCOUNTER — HEALTH MAINTENANCE LETTER (OUTPATIENT)
Age: 47
End: 2022-01-22

## 2022-05-14 ENCOUNTER — HEALTH MAINTENANCE LETTER (OUTPATIENT)
Age: 47
End: 2022-05-14

## 2022-07-22 ENCOUNTER — TRANSCRIBE ORDERS (OUTPATIENT)
Dept: OTHER | Age: 47
End: 2022-07-22

## 2022-07-22 DIAGNOSIS — M54.2 NECK PAIN: Primary | ICD-10-CM

## 2022-08-02 ENCOUNTER — THERAPY VISIT (OUTPATIENT)
Dept: PHYSICAL THERAPY | Facility: CLINIC | Age: 47
End: 2022-08-02
Attending: PHYSICIAN ASSISTANT
Payer: COMMERCIAL

## 2022-08-02 DIAGNOSIS — M54.2 CERVICAL PAIN: Primary | ICD-10-CM

## 2022-08-02 PROCEDURE — 97110 THERAPEUTIC EXERCISES: CPT | Mod: GP | Performed by: PHYSICAL THERAPIST

## 2022-08-02 PROCEDURE — 97161 PT EVAL LOW COMPLEX 20 MIN: CPT | Mod: GP | Performed by: PHYSICAL THERAPIST

## 2022-08-02 PROCEDURE — 97140 MANUAL THERAPY 1/> REGIONS: CPT | Mod: GP | Performed by: PHYSICAL THERAPIST

## 2022-08-02 NOTE — PROGRESS NOTES
Physical Therapy Initial Evaluation  Subjective:  The history is provided by the patient. No  was used.   Patient Health History  Alexia Mcginnis being seen for Cervical neck pain with constant left sided N & T into lateral shoulder. Four cervical surgeries with metal in her c spine. .     Problem began: 4/2/2022.   Problem occurred: indidious onset   Pain is reported as 7/10 on pain scale.  General health as reported by patient is good.  Pertinent medical history includes: depression, mental illness, migraines/headaches and numbness/tingling.   Red flags:  None as reported by patient.   Other medical allergies details: gluten, penicillin.   Surgeries include:  Orthopedic surgery. Other surgery history details: 4 neck surgeries in 2013, 2014,2015,2021.    Current medications:  Pain medication, sleep medication, hormone replacement therapy and anti-depressants. Other medications details: hydrocodone.    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                     Therapist Generated HPI Evaluation  Problem details: C5-7 fused. .         Type of problem:  Cervical spine.    This is a chronic condition.  Condition occurred with:  Insidious onset.    Patient reports pain:  Mid cervical spine, lower cervical spine, cervical left side, cervical right side and upper thoracic.  Pain is described as aching (and tightness) and is constant.  Pain radiates to:  Shoulder left. Pain is worse in the P.M..  Since onset symptoms are gradually worsening.  Associated symptoms:  Tingling and numbness. Symptoms are exacerbated by carrying, lifting, rotating head, driving, sitting and other (housework)  and relieved by ice, heat, other and rest (hydrocodone).                              Objective:  Standing Alignment:      Shoulder/UE:  Rounded shoulders and elevated scapula L                  Flexibility/Screens:         Spine:  Decreased left spine flexibility:  Scalenes; Upper Trap and  Levator    Decreased right spine flexibility:  Scalenes; Upper Trap and Levator                  Cervical/Thoracic Evaluation    AROM:  AROM Cervical:    Flexion:            60% tightness bilateral CS  Extension:       100% tightness CS  Rotation:         Left: 100% tightness on L UT     Right: 100% tight and painful on right UT  Side Bend:      Left: 100% tightness right UT     Right:  100% tightenss on left UT      Headaches: none  Cervical Myotomes:          C5 (Deltoid):  Left: 4    Right: 5  C6 (Biceps):  Left: 4    Right: 5  C7 (Triceps):  Left: 4    Right: 5  C8 (Thumb Ext): Left: 5    Right: 5  T1 (Intrinsics): Left: 5    Right: 5  DTR's:    C5 (Biceps):  Left:  2  Right:  4     C6 (Brachioradialis):  Left:  0  Right:  4     C7 (Triceps):  Left:  2  Right:  4    Cervical Dermatomes:            C5 left:  Hypo-light touch       C6 left:  Hypo-light touch       C7 left:  Hypo-light touch       C8 left:  Hypo-light touch       T1 left:  Normal-light touch       Cervical Palpation:    Tenderness present at Left:    Scalenes; Upper Trap; Levator and Facet  Tenderness present at Right:    Scalenes; Upper Trap; Levator and Facet    Cervical Stability/Joint Clearing:    Left positive at:Mobility  Right positive at:  Mobility  Spinal Segmental Conclusions:    Level:  Hypo at C2, C3, C4, C5, C6, C7, T1, T2, T3, T4, T5 and T6                                                    General Evaluation:            Palpation:    Significant findings:  Tenderness and tightness bilateral cervical paraspinals into UT's and scalenes L>R                                                       ROS    Assessment/Plan:    Patient is a 47 year old female with cervical complaints.    Patient has the following significant findings with corresponding treatment plan.                Diagnosis 1:  Cervical pain  Pain -  hot/cold therapy, electric stimulation, mechanical traction, manual therapy, education and home program  Decreased  ROM/flexibility - manual therapy and therapeutic exercise  Decreased joint mobility - manual therapy and therapeutic exercise  Decreased strength - therapeutic exercise, therapeutic activities and home program  Decreased function - home program  Impaired posture - neuro re-education and home program    Therapy Evaluation Codes:   History comprised of:  Cumulative Therapy Evaluation is: Low complexity.    Previous and current functional limitations:  (See Goal Flow Sheet for this information)    Short term and Long term goals: (See Goal Flow Sheet for this information)     Communication ability:  Patient appears to be able to clearly communicate and understand verbal and written communication and follow directions correctly.  Treatment Explanation - The following has been discussed with the patient:   Possible risks and side effects  This patient would benefit from PT intervention to resume normal activities.   Rehab potential is good.    Frequency:  2 X week, once daily  Duration:  for 4-6 weeks  Discharge Plan:  Achieve all LTG.  Independent in home treatment program.  Reach maximal therapeutic benefit.    Please refer to the daily flowsheet for treatment today, total treatment time and time spent performing 1:1 timed codes.

## 2022-08-04 ENCOUNTER — THERAPY VISIT (OUTPATIENT)
Dept: PHYSICAL THERAPY | Facility: CLINIC | Age: 47
End: 2022-08-04
Payer: COMMERCIAL

## 2022-08-04 DIAGNOSIS — M54.2 CERVICAL PAIN: Primary | ICD-10-CM

## 2022-08-04 PROCEDURE — 97140 MANUAL THERAPY 1/> REGIONS: CPT | Mod: GP | Performed by: PHYSICAL THERAPIST

## 2022-08-23 ENCOUNTER — THERAPY VISIT (OUTPATIENT)
Dept: PHYSICAL THERAPY | Facility: CLINIC | Age: 47
End: 2022-08-23
Payer: COMMERCIAL

## 2022-08-23 DIAGNOSIS — M54.2 CERVICAL PAIN: Primary | ICD-10-CM

## 2022-08-23 PROCEDURE — 97014 ELECTRIC STIMULATION THERAPY: CPT | Mod: GP | Performed by: PHYSICAL THERAPIST

## 2022-08-23 PROCEDURE — 97010 HOT OR COLD PACKS THERAPY: CPT | Mod: GP | Performed by: PHYSICAL THERAPIST

## 2022-08-25 ENCOUNTER — THERAPY VISIT (OUTPATIENT)
Dept: PHYSICAL THERAPY | Facility: CLINIC | Age: 47
End: 2022-08-25
Payer: COMMERCIAL

## 2022-08-25 DIAGNOSIS — M54.2 CERVICAL PAIN: Primary | ICD-10-CM

## 2022-08-25 PROCEDURE — 97110 THERAPEUTIC EXERCISES: CPT | Mod: 59 | Performed by: PHYSICAL THERAPIST

## 2022-08-25 PROCEDURE — 97035 APP MDLTY 1+ULTRASOUND EA 15: CPT | Mod: GP | Performed by: PHYSICAL THERAPIST

## 2022-09-01 ENCOUNTER — THERAPY VISIT (OUTPATIENT)
Dept: PHYSICAL THERAPY | Facility: CLINIC | Age: 47
End: 2022-09-01
Payer: COMMERCIAL

## 2022-09-01 DIAGNOSIS — M54.2 CERVICAL PAIN: Primary | ICD-10-CM

## 2022-09-01 PROCEDURE — 97035 APP MDLTY 1+ULTRASOUND EA 15: CPT | Mod: GP | Performed by: PHYSICAL THERAPIST

## 2022-09-01 PROCEDURE — 97110 THERAPEUTIC EXERCISES: CPT | Mod: GP | Performed by: PHYSICAL THERAPIST

## 2022-09-04 ENCOUNTER — HEALTH MAINTENANCE LETTER (OUTPATIENT)
Age: 47
End: 2022-09-04

## 2022-09-18 ENCOUNTER — HOSPITAL ENCOUNTER (EMERGENCY)
Facility: CLINIC | Age: 47
Discharge: HOME | End: 2022-09-18
Attending: EMERGENCY MEDICINE | Admitting: EMERGENCY MEDICINE
Payer: COMMERCIAL

## 2022-09-18 VITALS
DIASTOLIC BLOOD PRESSURE: 74 MMHG | RESPIRATION RATE: 16 BRPM | TEMPERATURE: 97.9 F | OXYGEN SATURATION: 98 % | SYSTOLIC BLOOD PRESSURE: 118 MMHG | HEART RATE: 105 BPM

## 2022-09-18 DIAGNOSIS — N30.01 ACUTE CYSTITIS WITH HEMATURIA: ICD-10-CM

## 2022-09-18 LAB
ALBUMIN UR-MCNC: 100 MG/DL
APPEARANCE UR: ABNORMAL
BILIRUB UR QL STRIP: NEGATIVE
COLOR UR AUTO: ABNORMAL
GLUCOSE UR STRIP-MCNC: NEGATIVE MG/DL
HGB UR QL STRIP: ABNORMAL
KETONES UR STRIP-MCNC: NEGATIVE MG/DL
LEUKOCYTE ESTERASE UR QL STRIP: ABNORMAL
NITRATE UR QL: NEGATIVE
PH UR STRIP: 5.5 [PH] (ref 5–7)
RBC URINE: >182 /HPF
SP GR UR STRIP: 1.02 (ref 1–1.03)
SQUAMOUS EPITHELIAL: 4 /HPF
UROBILINOGEN UR STRIP-MCNC: NORMAL MG/DL
WBC CLUMPS #/AREA URNS HPF: PRESENT /HPF
WBC URINE: >182 /HPF

## 2022-09-18 PROCEDURE — 99284 EMERGENCY DEPT VISIT MOD MDM: CPT

## 2022-09-18 PROCEDURE — 81001 URINALYSIS AUTO W/SCOPE: CPT | Performed by: EMERGENCY MEDICINE

## 2022-09-18 RX ORDER — CEPHALEXIN 500 MG/1
500 CAPSULE ORAL 2 TIMES DAILY
Qty: 14 CAPSULE | Refills: 0 | Status: SHIPPED | OUTPATIENT
Start: 2022-09-18 | End: 2022-09-25

## 2022-09-18 RX ORDER — PHENAZOPYRIDINE HYDROCHLORIDE 200 MG/1
200 TABLET, FILM COATED ORAL 3 TIMES DAILY PRN
Qty: 10 TABLET | Refills: 0 | Status: SHIPPED | OUTPATIENT
Start: 2022-09-18 | End: 2022-09-21

## 2022-09-18 ASSESSMENT — ENCOUNTER SYMPTOMS
FLANK PAIN: 0
HEMATURIA: 1
DYSURIA: 1

## 2022-09-18 ASSESSMENT — ACTIVITIES OF DAILY LIVING (ADL): ADLS_ACUITY_SCORE: 33

## 2022-09-18 NOTE — DISCHARGE INSTRUCTIONS
Start antibiotic right away  Push fluids  Tylenol and motrin for pain as needed  Pyridium for bladder spasm, will turn your urine color orange  Return if worsening pain or fever, or flank pain

## 2022-09-18 NOTE — ED TRIAGE NOTES
"UTI for 1 week. Attempting to treat at home without antibiotics using \"cranberry stuff\". Woke up with hematuria, urinary frequency and dysuria.       "

## 2022-09-18 NOTE — ED PROVIDER NOTES
History   Chief Complaint:  No chief complaint on file.       The history is provided by the patient.      Alexia Mcginnis is a 47 year old female with history of Crohn's disease and depression who presents with urinary frequency, pain, and burning for one week. She presents to ED because she noticed blood in urine this morning. She denies flank pain. She denies being on any antibiotics. She denies possibility of pregnancy.    Review of Systems   Genitourinary: Positive for dysuria and hematuria. Negative for flank pain.        + urinary frequency   All other systems reviewed and are negative.    Allergies:  Gluten Meal  Penicillins    Medications:  Cholestyramine  Venlafaxine  Trazodone  Gabapentin  Hydrocodone-acetaminophen  Albuterol  Bupropion  Cholestyramine  Trazodone  Venlafaxine HCl  Duloxetine  Estradiol  Cholestyramine-sucrose  Topiramate    Past Medical History:     Cervical pain  Mid back pain  Occipital headache  Pseudoarthritis of cervical spine  Tarsal tunnel syndrome  Herniation of cervical intervertebral disc with radiculopathy  Lumbar spondylosis  Brachial neuritis or radiculitis NOS  Spinal stenosis in cervical region  Crohn's disease  Viral warts  Depressive disorder  Regional enteritis of unspecified region  Chronic pain  Dysplasia of cervix, low grade  Breech presentation  Suspected rupture of membranes not found for normal first pregnancy   uterine contractions  LSIL  HPV     Past Surgical History:    Partial bowel resection  Placement of artificial cervical disc and arthroplasty at C5-6  Colposcopy  Posterior laminoforaminotomy, left C4-6  Anterior cervical decompression and fusion C4-6 and removal of disc replacement C5-6   section X1     Family History:    Father- alcohol, drug  Mother- ovarian cancer    Social History:  PCP: Carmelina Villagomez   Patient arrived by car  Patient presents alone    Physical Exam     Patient Vitals for the past 24 hrs:   BP Temp Pulse  Resp SpO2   09/18/22 0604 118/74 97.9  F (36.6  C) 105 16 98 %       Physical Exam  Constitutional:       Appearance: She is well-developed.   Cardiovascular:      Rate and Rhythm: Normal rate and regular rhythm.      Heart sounds: Normal heart sounds. No murmur heard.    No friction rub. No gallop.   Pulmonary:      Effort: Pulmonary effort is normal. No respiratory distress.      Breath sounds: Normal breath sounds. No wheezing or rales.   Abdominal:      General: Bowel sounds are normal. There is no distension.      Palpations: Abdomen is soft. There is no mass.      Tenderness: There is no abdominal tenderness. There is no right CVA tenderness or left CVA tenderness.   Musculoskeletal:         General: Normal range of motion.   Skin:     General: Skin is warm and dry.      Capillary Refill: Capillary refill takes less than 2 seconds.      Findings: No rash.   Neurological:      Mental Status: She is alert.           Emergency Department Course     Laboratory:  Labs Ordered and Resulted from Time of ED Arrival to Time of ED Departure   ROUTINE UA WITH MICROSCOPIC - Abnormal       Result Value    Color Urine Orange (*)     Appearance Urine Cloudy (*)     Glucose Urine Negative      Bilirubin Urine Negative      Ketones Urine Negative      Specific Gravity Urine 1.016      Blood Urine Large (*)     pH Urine 5.5      Protein Albumin Urine 100  (*)     Urobilinogen Urine Normal      Nitrite Urine Negative      Leukocyte Esterase Urine Large (*)     WBC Clumps Urine Present (*)     RBC Urine >182 (*)     WBC Urine >182 (*)     Squamous Epithelials Urine 4 (*)       Emergency Department Course:     Reviewed:  I reviewed nursing notes, vitals, past medical history and Care Everywhere    Assessments:  0632 I obtained history and examined the patient as noted above.   0700 I rechecked the patient and explained findings.     Disposition:  The patient was discharged to home.     Impression & Plan     Medical Decision  Making:  Patient presents for evaluation of UTI symptoms.  On her evaluation, she has no signs of systemic illness such as fever, flank pain, nausea or vomiting.  Her urine does show signs of infection.  She reports no history of frequent UTIs or prior antibiotic use for UTIs recently.  She has a minor allergy to penicillin.  We will prescribe her Keflex as well as Pyridium.  She is instructed to follow-up with her primary.  Return precautions provided if symptoms are worsening or she develops systemic symptoms.  Patient discharged in stable improved condition.    Diagnosis:    ICD-10-CM    1. Acute cystitis with hematuria  N30.01        Discharge Medications:  Discharge Medication List as of 9/18/2022  7:05 AM      START taking these medications    Details   cephALEXin (KEFLEX) 500 MG capsule Take 1 capsule (500 mg) by mouth 2 times daily for 7 days, Disp-14 capsule, R-0, [facility]      phenazopyridine (PYRIDIUM) 200 MG tablet Take 1 tablet (200 mg) by mouth 3 times daily as needed for irritation, Disp-10 tablet, R-0, [facility]             Scribe Disclosure:  Cristina SANCHEZ, am serving as a scribe at 7:03 AM on 9/18/2022 to document services personally performed by Kary Story MD based on my observations and the provider's statements to me.            Kary Story MD  09/18/22 0908

## 2023-01-19 PROBLEM — M54.2 CERVICAL PAIN: Status: RESOLVED | Noted: 2022-08-02 | Resolved: 2023-01-19

## 2023-01-19 NOTE — PROGRESS NOTES
Patient seen for one time evaluation and treatment.  Patient did not return for further treatment and current status is unknown.  Please see initial evaluation for further information.

## 2023-01-19 NOTE — PROGRESS NOTES
Discharge Note  Alexia was seen for 4 visits and was returning to doctor and possibly having a rhizontomy.    SUBJECTIVE  Subjective changes noted by patient:  Report that she still has pain and stiffness in the neck. No headache or sx down the arm. 10 min late for appt.  .  Current pain level is 5/10.     Previous pain level was  7/10.   Adverse reaction to treatment or activity: None    OBJECTIVE  Changes noted in objective findings:       ASSESSMENT/PLAN  Diagnosis: cervical pain   STG/LTGs have not  been met     Last current status:     Self Management Plans:  HEP    Alexia continues to require the following intervention to meet STG and LTG's:  HEP.    Recommendations:  Discharge with current home program.  Patient to follow up with MD as needed.    Please refer to the daily flowsheet for treatment today, total treatment time and time spent performing 1:1 timed codes.    
220

## 2023-06-03 ENCOUNTER — HEALTH MAINTENANCE LETTER (OUTPATIENT)
Age: 48
End: 2023-06-03

## 2023-09-30 ENCOUNTER — HEALTH MAINTENANCE LETTER (OUTPATIENT)
Age: 48
End: 2023-09-30

## 2023-10-02 ENCOUNTER — APPOINTMENT (OUTPATIENT)
Dept: ULTRASOUND IMAGING | Facility: CLINIC | Age: 48
End: 2023-10-02
Attending: EMERGENCY MEDICINE
Payer: COMMERCIAL

## 2023-10-02 ENCOUNTER — APPOINTMENT (OUTPATIENT)
Dept: GENERAL RADIOLOGY | Facility: CLINIC | Age: 48
End: 2023-10-02
Attending: EMERGENCY MEDICINE
Payer: COMMERCIAL

## 2023-10-02 ENCOUNTER — HOSPITAL ENCOUNTER (EMERGENCY)
Facility: CLINIC | Age: 48
Discharge: HOME | End: 2023-10-02
Attending: EMERGENCY MEDICINE | Admitting: EMERGENCY MEDICINE
Payer: COMMERCIAL

## 2023-10-02 VITALS
RESPIRATION RATE: 20 BRPM | DIASTOLIC BLOOD PRESSURE: 87 MMHG | HEART RATE: 97 BPM | TEMPERATURE: 97.5 F | OXYGEN SATURATION: 100 % | SYSTOLIC BLOOD PRESSURE: 137 MMHG

## 2023-10-02 DIAGNOSIS — M79.662 PAIN OF LEFT LOWER LEG: ICD-10-CM

## 2023-10-02 PROCEDURE — 99284 EMERGENCY DEPT VISIT MOD MDM: CPT | Mod: 25

## 2023-10-02 PROCEDURE — 93971 EXTREMITY STUDY: CPT | Mod: LT

## 2023-10-02 PROCEDURE — 73610 X-RAY EXAM OF ANKLE: CPT | Mod: LT

## 2023-10-02 RX ORDER — IBUPROFEN 600 MG/1
600 TABLET, FILM COATED ORAL ONCE
Status: DISCONTINUED | OUTPATIENT
Start: 2023-10-02 | End: 2023-10-02 | Stop reason: HOSPADM

## 2023-10-02 ASSESSMENT — ACTIVITIES OF DAILY LIVING (ADL): ADLS_ACUITY_SCORE: 35

## 2023-10-02 NOTE — ED PROVIDER NOTES
History     Chief Complaint:  Ankle Pain       HPI   Alexia Mcginnis is a 48 year old female with a history of Crohn's disease who presents to the emergency department for evaluation of ankle pain. Patient reports that the pain began around 1700 last night in the left medial malleolus, then woke her up at 0300 this morning. The pain radiates up the left calf and is made worse with walking. There was no injury or trauma to the ankle. Patient says that the pain makes it difficult to stand and walk. She did not take any OTC medication to help the pain. She notes that she is very sedentary and is concerned about a blood clot. She has no history of blood clots and has had no recent travel or long flights. She does not smoke. Patient denies chest pain and shortness of breath.     Independent Historian:   None - Patient Only    Medications:    Albuterol  Wellbutrin  Questran  Desyrel  Effexor   Neurontin    Past Medical History:    Anxiety  Chronic neck pain  Crohn's disease  Depressive disorder  Spinal stenosis  Occipital headache    Past Surgical History:    Back surgery  Gyn surgery  Small intestine surgery    Physical Exam   Patient Vitals for the past 24 hrs:   BP Temp Temp src Pulse Resp SpO2   10/02/23 0813 137/87 97.5  F (36.4  C) Temporal 97 20 100 %        Physical Exam  Constitutional: Vital signs reviewed.  Pleasant.  HEENT: Moist mucous membranes  Cardiovascular: Regular rate and rhythm  Pulmonary/Chest: Breathing comfortably on room air.  No audible wheezing  Musculoskeletal/Extremities: Tenderness over left medial malleolus. No bony deformity of erythema. No calf swelling or tenderness. Normal distal pulse and sensation.   Neurological: Alert.  No focal deficits.  Endo: No pitting edema  Skin: No visible rash.  Psychiatric: Pleasant.      Emergency Department Course       Imaging:  US Lower Extremity Venous Duplex Left   Final Result   IMPRESSION:    No evidence of deep venous thrombosis in the left  lower extremity      BILL CARLSON MD            SYSTEM ID:  [id number]      XR Ankle Left G/E 3 Views   Final Result   IMPRESSION: No acute fracture is identified. There is normal joint   spacing and alignment. The ankle mortise is congruent. The talar dome   is unremarkable.      SARAH GUTIÉRREZ MD            SYSTEM ID:  [id number]         Report per radiology    Emergency Department Course & Assessments:         Interventions:  Medications   ibuprofen (ADVIL/MOTRIN) tablet 600 mg (has no administration in time range)        Assessments:  0904 I obtained history and examined the patient as noted above.  1055 I rechecked and updated the patient.   The patient is comfortable with plan for discharge.    Independent Interpretation (X-rays, CTs, rhythm strip):  X-ray of the left ankle was negative.  Ultrasound of the left lower extremity is negative also.    Consultations/Discussion of Management or Tests:  None        Social Determinants of Health affecting care:   None    Disposition:  The patient was discharged to home.     Impression & Plan      Medical Decision Making:  Patient comes in for nontraumatic left ankle pain.  She was given ibuprofen here.  There was again no trauma or injury that she think of.  There is no signs of infection or swelling.  X-ray was ordered prior to my seeing her and came back negative which fits with what I am seeing clinically.  Her concern is for possible DVT as she is sedentary at work.  However there is no swelling and she is got a palpable distal pulse and sensation is normal.  Thankfully the ultrasound was indeed negative.  She was given ibuprofen here.  She will continue with NSAIDs at home.  Follow-up as needed.      Diagnosis:    ICD-10-CM    1. Pain of left lower leg  M79.662            Discharge Medications:  New Prescriptions    No medications on file          Scribe Disclosure:  Maggie SANCHEZ, am serving as a scribe at 9:07 AM on 10/2/2023 to document services  personally performed by Dick David MD based on my observations and the provider's statements to me.   10/2/2023   Dick David MD Walters, Brent Aaron, MD  10/02/23 1050

## 2023-10-02 NOTE — ED TRIAGE NOTES
Left medial malleolus pain. Started last night and getting worse. Woke patient around 0300 and she couldn't get back to sleep. Starting to swell. Denies trauma and travel. No deformity noted.

## 2024-11-23 ENCOUNTER — HEALTH MAINTENANCE LETTER (OUTPATIENT)
Age: 49
End: 2024-11-23

## 2025-06-22 ENCOUNTER — HEALTH MAINTENANCE LETTER (OUTPATIENT)
Age: 50
End: 2025-06-22